# Patient Record
Sex: MALE | Race: WHITE | Employment: FULL TIME | URBAN - METROPOLITAN AREA
[De-identification: names, ages, dates, MRNs, and addresses within clinical notes are randomized per-mention and may not be internally consistent; named-entity substitution may affect disease eponyms.]

---

## 2017-01-04 ENCOUNTER — TELEPHONE (OUTPATIENT)
Dept: FAMILY MEDICINE CLINIC | Age: 37
End: 2017-01-04

## 2017-01-04 RX ORDER — AZITHROMYCIN 250 MG/1
TABLET, FILM COATED ORAL
Qty: 6 TABLET | Refills: 0 | Status: SHIPPED | OUTPATIENT
Start: 2017-01-04 | End: 2017-01-09

## 2017-01-09 ENCOUNTER — TELEPHONE (OUTPATIENT)
Dept: FAMILY MEDICINE CLINIC | Age: 37
End: 2017-01-09

## 2017-01-09 RX ORDER — DOXYCYCLINE HYCLATE 100 MG
100 TABLET ORAL 2 TIMES DAILY
Qty: 20 TABLET | Refills: 0 | Status: SHIPPED | OUTPATIENT
Start: 2017-01-09 | End: 2017-01-19

## 2017-01-30 ENCOUNTER — OFFICE VISIT (OUTPATIENT)
Dept: FAMILY MEDICINE CLINIC | Age: 37
End: 2017-01-30

## 2017-01-30 VITALS
HEART RATE: 76 BPM | HEIGHT: 72 IN | OXYGEN SATURATION: 99 % | DIASTOLIC BLOOD PRESSURE: 84 MMHG | BODY MASS INDEX: 28.85 KG/M2 | WEIGHT: 213 LBS | SYSTOLIC BLOOD PRESSURE: 130 MMHG

## 2017-01-30 DIAGNOSIS — J01.41 ACUTE RECURRENT PANSINUSITIS: Primary | ICD-10-CM

## 2017-01-30 PROCEDURE — 99213 OFFICE O/P EST LOW 20 MIN: CPT | Performed by: FAMILY MEDICINE

## 2017-01-30 RX ORDER — AMOXICILLIN 875 MG/1
875 TABLET, COATED ORAL 2 TIMES DAILY
Qty: 28 TABLET | Refills: 0 | Status: SHIPPED | OUTPATIENT
Start: 2017-01-30 | End: 2017-08-30 | Stop reason: SDUPTHER

## 2017-01-30 ASSESSMENT — PATIENT HEALTH QUESTIONNAIRE - PHQ9
2. FEELING DOWN, DEPRESSED OR HOPELESS: 0
SUM OF ALL RESPONSES TO PHQ9 QUESTIONS 1 & 2: 0
SUM OF ALL RESPONSES TO PHQ QUESTIONS 1-9: 0
1. LITTLE INTEREST OR PLEASURE IN DOING THINGS: 0

## 2017-01-30 ASSESSMENT — ENCOUNTER SYMPTOMS
GASTROINTESTINAL NEGATIVE: 1
COUGH: 1
SINUS PRESSURE: 1

## 2017-02-09 ENCOUNTER — TELEPHONE (OUTPATIENT)
Dept: FAMILY MEDICINE CLINIC | Age: 37
End: 2017-02-09

## 2017-02-09 DIAGNOSIS — M51.37 DEGENERATION OF LUMBAR OR LUMBOSACRAL INTERVERTEBRAL DISC: Primary | ICD-10-CM

## 2017-02-09 DIAGNOSIS — M51.26 DISPLACEMENT OF LUMBAR INTERVERTEBRAL DISC WITHOUT MYELOPATHY: ICD-10-CM

## 2017-03-23 ENCOUNTER — HOSPITAL ENCOUNTER (OUTPATIENT)
Dept: MRI IMAGING | Age: 37
Discharge: HOME | End: 2017-03-23

## 2017-03-23 DIAGNOSIS — M51.26 DISPLACEMENT OF LUMBAR INTERVERTEBRAL DISC WITHOUT MYELOPATHY: ICD-10-CM

## 2017-06-27 ENCOUNTER — TELEPHONE (OUTPATIENT)
Dept: FAMILY MEDICINE CLINIC | Age: 37
End: 2017-06-27

## 2017-06-28 RX ORDER — AZITHROMYCIN 250 MG/1
TABLET, FILM COATED ORAL
Qty: 6 TABLET | Refills: 0 | Status: SHIPPED | OUTPATIENT
Start: 2017-06-28 | End: 2017-07-08

## 2017-08-30 ENCOUNTER — TELEPHONE (OUTPATIENT)
Dept: FAMILY MEDICINE CLINIC | Age: 37
End: 2017-08-30

## 2017-08-30 RX ORDER — AMOXICILLIN 875 MG/1
875 TABLET, COATED ORAL 2 TIMES DAILY
Qty: 28 TABLET | Refills: 0 | Status: SHIPPED | OUTPATIENT
Start: 2017-08-30 | End: 2017-11-17 | Stop reason: ALTCHOICE

## 2017-11-17 ENCOUNTER — OFFICE VISIT (OUTPATIENT)
Dept: FAMILY MEDICINE CLINIC | Age: 37
End: 2017-11-17

## 2017-11-17 VITALS
OXYGEN SATURATION: 99 % | DIASTOLIC BLOOD PRESSURE: 76 MMHG | WEIGHT: 208 LBS | BODY MASS INDEX: 28.21 KG/M2 | HEART RATE: 71 BPM | SYSTOLIC BLOOD PRESSURE: 102 MMHG

## 2017-11-17 DIAGNOSIS — G89.29 CHRONIC BILATERAL LOW BACK PAIN WITH LEFT-SIDED SCIATICA: Primary | ICD-10-CM

## 2017-11-17 DIAGNOSIS — M54.42 CHRONIC BILATERAL LOW BACK PAIN WITH LEFT-SIDED SCIATICA: Primary | ICD-10-CM

## 2017-11-17 PROCEDURE — 99213 OFFICE O/P EST LOW 20 MIN: CPT | Performed by: FAMILY MEDICINE

## 2017-11-17 RX ORDER — MELOXICAM 15 MG/1
15 TABLET ORAL DAILY
Qty: 30 TABLET | Refills: 3 | Status: SHIPPED | OUTPATIENT
Start: 2017-11-17 | End: 2022-07-22 | Stop reason: ALTCHOICE

## 2017-11-17 RX ORDER — CYCLOBENZAPRINE HCL 10 MG
10 TABLET ORAL 3 TIMES DAILY PRN
Qty: 30 TABLET | Refills: 0 | Status: SHIPPED | OUTPATIENT
Start: 2017-11-17 | End: 2017-11-27

## 2017-11-17 RX ORDER — METHYLPREDNISOLONE 4 MG/1
TABLET ORAL
Qty: 21 TABLET | Refills: 0 | Status: SHIPPED | OUTPATIENT
Start: 2017-11-17 | End: 2018-07-02

## 2017-11-17 ASSESSMENT — ENCOUNTER SYMPTOMS: BACK PAIN: 1

## 2017-11-17 NOTE — PROGRESS NOTES
tablet; Take 1 tablet by mouth 3 times daily as needed for Muscle spasms  Dispense: 30 tablet; Refill: 0  - meloxicam (MOBIC) 15 MG tablet; Take 1 tablet by mouth daily  Dispense: 30 tablet; Refill: 3  - methylPREDNISolone (MEDROL DOSEPACK) 4 MG tablet; Take by mouth. Dispense: 21 tablet; Refill: 0      Patient to return to clinic if symptoms worsen or fail to improve.

## 2018-04-04 ENCOUNTER — TELEPHONE (OUTPATIENT)
Dept: FAMILY MEDICINE CLINIC | Age: 38
End: 2018-04-04

## 2018-07-02 ENCOUNTER — OFFICE VISIT (OUTPATIENT)
Dept: FAMILY MEDICINE CLINIC | Age: 38
End: 2018-07-02

## 2018-07-02 VITALS
DIASTOLIC BLOOD PRESSURE: 82 MMHG | WEIGHT: 214 LBS | BODY MASS INDEX: 28.99 KG/M2 | HEIGHT: 72 IN | OXYGEN SATURATION: 98 % | RESPIRATION RATE: 16 BRPM | SYSTOLIC BLOOD PRESSURE: 130 MMHG | HEART RATE: 74 BPM | TEMPERATURE: 98.1 F

## 2018-07-02 DIAGNOSIS — L72.0 EPIDERMOID CYST OF SKIN: ICD-10-CM

## 2018-07-02 DIAGNOSIS — K42.9 UMBILICAL HERNIA WITHOUT OBSTRUCTION AND WITHOUT GANGRENE: ICD-10-CM

## 2018-07-02 PROCEDURE — 99213 OFFICE O/P EST LOW 20 MIN: CPT | Performed by: NURSE PRACTITIONER

## 2018-07-02 RX ORDER — HYDROCODONE BITARTRATE AND ACETAMINOPHEN 5; 325 MG/1; MG/1
TABLET ORAL
COMMUNITY
Start: 2018-06-08 | End: 2022-06-29 | Stop reason: SDUPTHER

## 2018-07-02 RX ORDER — METHOCARBAMOL 750 MG/1
TABLET, FILM COATED ORAL
COMMUNITY
Start: 2018-05-21

## 2018-07-02 ASSESSMENT — PATIENT HEALTH QUESTIONNAIRE - PHQ9
2. FEELING DOWN, DEPRESSED OR HOPELESS: 0
SUM OF ALL RESPONSES TO PHQ9 QUESTIONS 1 & 2: 0
1. LITTLE INTEREST OR PLEASURE IN DOING THINGS: 0
SUM OF ALL RESPONSES TO PHQ QUESTIONS 1-9: 0

## 2018-07-02 NOTE — PROGRESS NOTES
Mateusz Lam 44 y.o. male    Chief Complaint   Patient presents with    Mass     small bump near umbilical- no pain       HPI     Lump around umbilicus- popped out after stretching, noted a week ago. No pain. No redness. Nodule under skin, left forearm, has had it for many years, unchanged, no pain. Past medical, surgical, family and social history were reviewed and updated with the patient. Current Outpatient Prescriptions:     methocarbamol (ROBAXIN) 750 MG tablet, , Disp: , Rfl:     HYDROcodone-acetaminophen (NORCO) 5-325 MG per tablet, , Disp: , Rfl:     meloxicam (MOBIC) 15 MG tablet, Take 1 tablet by mouth daily, Disp: 30 tablet, Rfl: 3    esomeprazole Magnesium (NEXIUM) 40 MG PACK, Take 40 mg by mouth daily. , Disp: , Rfl:     methylPREDNISolone (MEDROL DOSEPACK) 4 MG tablet, Take by mouth., Disp: 21 tablet, Rfl: 0    ROS  Pertinent items noted in HPI    Physical Exam   Constitutional: He is oriented to person, place, and time. He appears well-developed and well-nourished. No distress. Abdominal: A hernia (umbilical, small, reducible, non tender) is present. Neurological: He is alert and oriented to person, place, and time. Skin:   Right forearm with about dime size, hard nodule, movable, no erythema, no tenderness   Nursing note and vitals reviewed. Vitals:    07/02/18 0909   BP: 130/82   Pulse: 74   Resp: 16   Temp: 98.1 °F (36.7 °C)   SpO2: 98%     Assessment    1. Umbilical hernia without obstruction and without gangrene    2. Epidermoid cyst of skin        Plan    Zuleika Meek was seen today for mass and mass.     Diagnoses and all orders for this visit:    Umbilical hernia without obstruction and without gangrene  - asymptomatic, observe for now, follow-up if worsen  Epidermoid cyst of skin  - reassurance provided, follow-up if gets bigger, red, painful

## 2019-04-05 ENCOUNTER — OFFICE VISIT (OUTPATIENT)
Dept: FAMILY MEDICINE CLINIC | Age: 39
End: 2019-04-05

## 2019-04-05 VITALS
DIASTOLIC BLOOD PRESSURE: 90 MMHG | BODY MASS INDEX: 32.05 KG/M2 | HEIGHT: 72 IN | SYSTOLIC BLOOD PRESSURE: 145 MMHG | RESPIRATION RATE: 20 BRPM | TEMPERATURE: 97.6 F | OXYGEN SATURATION: 99 % | WEIGHT: 236.6 LBS | HEART RATE: 76 BPM

## 2019-04-05 DIAGNOSIS — I10 ESSENTIAL HYPERTENSION: ICD-10-CM

## 2019-04-05 DIAGNOSIS — L50.9 URTICARIA: Primary | ICD-10-CM

## 2019-04-05 DIAGNOSIS — F41.0 PANIC ATTACKS: ICD-10-CM

## 2019-04-05 PROCEDURE — 99212 OFFICE O/P EST SF 10 MIN: CPT | Performed by: FAMILY MEDICINE

## 2019-04-05 RX ORDER — PAROXETINE 10 MG/1
10 TABLET, FILM COATED ORAL DAILY
Qty: 30 TABLET | Refills: 0 | Status: SHIPPED | OUTPATIENT
Start: 2019-04-05 | End: 2019-04-27 | Stop reason: SDUPTHER

## 2019-04-05 ASSESSMENT — PATIENT HEALTH QUESTIONNAIRE - PHQ9
2. FEELING DOWN, DEPRESSED OR HOPELESS: 0
SUM OF ALL RESPONSES TO PHQ QUESTIONS 1-9: 0
SUM OF ALL RESPONSES TO PHQ9 QUESTIONS 1 & 2: 0
1. LITTLE INTEREST OR PLEASURE IN DOING THINGS: 0
SUM OF ALL RESPONSES TO PHQ QUESTIONS 1-9: 0

## 2019-04-05 ASSESSMENT — ENCOUNTER SYMPTOMS
ABDOMINAL PAIN: 0
SHORTNESS OF BREATH: 0
COUGH: 0
BLOOD IN STOOL: 0

## 2019-04-05 NOTE — PROGRESS NOTES
Subjective:      Patient ID: Juarez Durant is a 43 y.o. male. HPI  In for check on poss HT(has been up at orthop office-onset 3 mos)--Hives(onset 6-8 weeks-2-3 x per week-did stop some vitamins he started-hives gone for now)--upper CP-onset 2 weeks-2 x per week-lasts 2 hrs-liquids help for few min--GERD-on PPI x several yrs. Anxiety-near panic-onset 1 mo-daily-usually HS. Prior to Visit Medications :  Medication methocarbamol (ROBAXIN) 750 MG tablet, Sig , Taking? Yes, Authorizing Provider Historical Provider, MD    Medication HYDROcodone-acetaminophen ([address]) 5-325 MG per tablet, Sig , Taking? Yes, Authorizing Provider Historical Provider, MD    Medication meloxicam (MOBIC) 15 MG tablet, Sig Take 1 tablet by mouth daily, Taking? Yes, Authorizing Provider Óscar Gramajo, DO    Medication esomeprazole Magnesium (NEXIUM) 40 MG PACK, Sig Take 40 mg by mouth daily. , Taking? Yes, Authorizing Provider Historical Provider, MD      Past Medical History:  No date: Allergic rhinitis  12/17/2014: Degeneration of lumbar or lumbosacral intervertebral disc  2007: GERD (gastroesophageal reflux disease)      Comment:  reflux  No date: Headache(784.0)  No date: Renal calculus  No date: Tobacco abuse  No date: Tobacco abuse  3/2/12: Vitamin D deficiency      Comment:  11.6 ng/mL        Review of Systems    Review of Systems   Constitutional: Negative for unexpected weight change. HENT: Negative for congestion and postnasal drip. Eyes: Negative for visual disturbance. Respiratory: Negative for cough and shortness of breath. Cardiovascular: Negative for palpitations. See hpi--interm palpitations-2 x per week-lasts few secs   Gastrointestinal: Negative for abdominal pain and blood in stool. See hpi     Neurological: Negative for tremors and headaches. Psychiatric/Behavioral: Negative for sleep disturbance. The patient is nervous/anxious.         Objective:   Physical Exam      Physical Exam

## 2019-12-06 ENCOUNTER — TELEPHONE (OUTPATIENT)
Dept: FAMILY MEDICINE CLINIC | Age: 39
End: 2019-12-06

## 2019-12-06 ENCOUNTER — OFFICE VISIT (OUTPATIENT)
Dept: FAMILY MEDICINE CLINIC | Age: 39
End: 2019-12-06

## 2019-12-06 VITALS
RESPIRATION RATE: 16 BRPM | HEIGHT: 72 IN | DIASTOLIC BLOOD PRESSURE: 75 MMHG | SYSTOLIC BLOOD PRESSURE: 140 MMHG | HEART RATE: 90 BPM | OXYGEN SATURATION: 97 % | WEIGHT: 232 LBS | TEMPERATURE: 97.6 F | BODY MASS INDEX: 31.42 KG/M2

## 2019-12-06 DIAGNOSIS — J01.90 ACUTE NON-RECURRENT SINUSITIS, UNSPECIFIED LOCATION: Primary | ICD-10-CM

## 2019-12-06 PROCEDURE — 99213 OFFICE O/P EST LOW 20 MIN: CPT | Performed by: FAMILY MEDICINE

## 2019-12-06 RX ORDER — AMOXICILLIN 875 MG/1
875 TABLET, COATED ORAL 2 TIMES DAILY
Qty: 20 TABLET | Refills: 0 | Status: SHIPPED | OUTPATIENT
Start: 2019-12-06 | End: 2019-12-16

## 2019-12-06 ASSESSMENT — ENCOUNTER SYMPTOMS
VOMITING: 0
SHORTNESS OF BREATH: 0
SORE THROAT: 1
NAUSEA: 0
SINUS PRESSURE: 1
DIARRHEA: 0
COUGH: 1

## 2020-02-20 ENCOUNTER — TELEPHONE (OUTPATIENT)
Dept: FAMILY MEDICINE CLINIC | Age: 40
End: 2020-02-20

## 2020-02-20 NOTE — TELEPHONE ENCOUNTER
LMA on pt's vm for him to r/s with his PCP. Advised that if he wants to switch PCP's the change must be approved by both providers.

## 2020-02-24 ENCOUNTER — OFFICE VISIT (OUTPATIENT)
Dept: FAMILY MEDICINE CLINIC | Age: 40
End: 2020-02-24

## 2020-02-24 VITALS
HEART RATE: 85 BPM | DIASTOLIC BLOOD PRESSURE: 90 MMHG | HEIGHT: 72 IN | WEIGHT: 236 LBS | OXYGEN SATURATION: 98 % | SYSTOLIC BLOOD PRESSURE: 135 MMHG | BODY MASS INDEX: 31.97 KG/M2

## 2020-02-24 PROBLEM — R07.89 OTHER CHEST PAIN: Status: ACTIVE | Noted: 2020-02-24

## 2020-02-24 PROBLEM — S16.1XXA CERVICAL STRAIN: Status: ACTIVE | Noted: 2020-02-24

## 2020-02-24 PROBLEM — I10 DIASTOLIC HYPERTENSION: Status: ACTIVE | Noted: 2020-02-24

## 2020-02-24 PROCEDURE — 99212 OFFICE O/P EST SF 10 MIN: CPT | Performed by: FAMILY MEDICINE

## 2020-02-24 RX ORDER — METOPROLOL SUCCINATE 25 MG/1
25 TABLET, EXTENDED RELEASE ORAL DAILY
Qty: 30 TABLET | Refills: 2 | Status: SHIPPED | OUTPATIENT
Start: 2020-02-24 | End: 2020-04-20 | Stop reason: SDUPTHER

## 2020-02-24 ASSESSMENT — PATIENT HEALTH QUESTIONNAIRE - PHQ9
SUM OF ALL RESPONSES TO PHQ QUESTIONS 1-9: 0
SUM OF ALL RESPONSES TO PHQ9 QUESTIONS 1 & 2: 0
SUM OF ALL RESPONSES TO PHQ QUESTIONS 1-9: 0
2. FEELING DOWN, DEPRESSED OR HOPELESS: 0
1. LITTLE INTEREST OR PLEASURE IN DOING THINGS: 0

## 2020-02-24 ASSESSMENT — ENCOUNTER SYMPTOMS: SHORTNESS OF BREATH: 0

## 2020-02-24 NOTE — PROGRESS NOTES
Subjective:      Patient ID: Suellen Espinosa is a 35 y.o. male. HPI  Patient in for check on blood pressure- he goes to pain management and they have told him his pressures been up the last 2 or 3 times. He states he also needs to lose some weight which he knows will help his blood pressure. He states he has had some vague chest pains usually at night but can be during the day which are very sharp almost electric-like and usually last a matter of seconds-occasionally last up to 1 minute. Also intermittent neck pain usually on the left side of his neck which is fairly constant- pain is worse when he rotates his head to the left. Prior to Visit Medications :  Medication metoprolol succinate (TOPROL XL) 25 MG extended release tablet, Sig Take 1 tablet by mouth daily, Taking? Yes, Authorizing Provider Sarbjit Blanton, DO    Medication PARoxetine (PAXIL) 10 MG tablet, Sig TAKE 1 TABLET BY MOUTH EVERY DAY, Taking? Yes, Authorizing Provider Sarbjit Blanton, DO    Medication methocarbamol (ROBAXIN) 750 MG tablet, Sig , Taking? Yes, Authorizing Provider Historical Provider, MD    Medication HYDROcodone-acetaminophen ([address]) 5-325 MG per tablet, Sig , Taking? Yes, Authorizing Provider Historical Provider, MD    Medication meloxicam (MOBIC) 15 MG tablet, Sig Take 1 tablet by mouth daily, Taking? Yes, Authorizing Provider Sonya El, DO    Medication esomeprazole Magnesium (NEXIUM) 40 MG PACK, Sig Take 40 mg by mouth daily. , Taking? Yes, Authorizing Provider Historical Provider, MD      Past Medical History:  No date:  Allergic rhinitis  12/17/2014: Degeneration of lumbar or lumbosacral intervertebral disc  2007: GERD (gastroesophageal reflux disease)      Comment:  reflux  No date: Headache(784.0)  No date: Renal calculus  No date: Tobacco abuse  No date: Tobacco abuse  3/2/12: Vitamin D deficiency      Comment:  11.6 ng/mL            Review of Systems    Review of Systems   Respiratory: Negative for shortness of breath. Cardiovascular: Positive for chest pain. Negative for palpitations and leg swelling. See HPI   Musculoskeletal: Positive for neck pain. Objective:   Physical Exam      Physical Exam  Cardiovascular:      Rate and Rhythm: Normal rate. Heart sounds: Normal heart sounds. Pulmonary:      Effort: Pulmonary effort is normal.      Breath sounds: Normal breath sounds. Musculoskeletal:      Comments: Mild tenderness left lateral cervical muscles- pain increased with rotation to the left. Neurological:      Mental Status: He is oriented to person, place, and time. Assessment:      1. Diastolic hypertension    2. Strain of neck muscle, initial encounter    3. Other chest pain              Plan:      Jeny Reddy was seen today for blood pressure check. Diagnoses and all orders for this visit:    Diastolic hypertension  Prescription sent for metoprolol 25 mg-no added salt diet and start working on trying to lose 2 or 3 pounds a month which will help bring the blood pressure down. Strain of neck muscle, initial encounter  Instructed on neck exercises to be done twice a day  Other chest pain  Will review family history and consider EKG or possible stress test  Other orders  -     metoprolol succinate (TOPROL XL) 25 MG extended release tablet;  Take 1 tablet by mouth daily      See me 3 months        Sarbjit Blanton, DO

## 2020-04-20 RX ORDER — METOPROLOL SUCCINATE 25 MG/1
25 TABLET, EXTENDED RELEASE ORAL DAILY
Qty: 90 TABLET | Refills: 0 | Status: SHIPPED | OUTPATIENT
Start: 2020-04-20 | End: 2020-08-06

## 2020-08-06 RX ORDER — METOPROLOL SUCCINATE 25 MG/1
TABLET, EXTENDED RELEASE ORAL
Qty: 90 TABLET | Refills: 1 | Status: SHIPPED | OUTPATIENT
Start: 2020-08-06 | End: 2022-07-22 | Stop reason: ALTCHOICE

## 2020-08-06 NOTE — TELEPHONE ENCOUNTER
Refill Request     Last Seen: 2/24/2020    Last Written: 4/20/20     Next Appointment:   No future appointments.           Requested Prescriptions     Pending Prescriptions Disp Refills    metoprolol succinate (TOPROL XL) 25 MG extended release tablet [Pharmacy Med Name: METOPROLOL SUCC ER 25 MG TAB] 90 tablet 0     Sig: TAKE 1 TABLET BY MOUTH EVERY DAY

## 2022-05-01 ENCOUNTER — HOSPITAL ENCOUNTER (OUTPATIENT)
Dept: GENERAL RADIOLOGY | Age: 42
Discharge: HOME | End: 2022-05-01
Payer: COMMERCIAL

## 2022-05-01 ENCOUNTER — HOSPITAL ENCOUNTER (OUTPATIENT)
Age: 42
Discharge: HOME | End: 2022-05-01
Payer: COMMERCIAL

## 2022-05-01 DIAGNOSIS — M51.26 DISPLACEMENT OF LUMBAR INTERVERTEBRAL DISC WITHOUT MYELOPATHY: ICD-10-CM

## 2022-05-01 PROCEDURE — 72100 X-RAY EXAM L-S SPINE 2/3 VWS: CPT

## 2022-06-27 RX ORDER — HYDROCODONE BITARTRATE AND ACETAMINOPHEN 5; 325 MG/1; MG/1
1 TABLET ORAL EVERY 12 HOURS PRN
Qty: 60 TABLET | Refills: 0 | OUTPATIENT
Start: 2022-06-27 | End: 2022-07-27

## 2022-06-27 NOTE — TELEPHONE ENCOUNTER
Patients Wife called stating that 's Pain management clinic (Dr. Kahlil Durham) office Closed. Patient Has a New Patient pain Management appointment scheduled with a New Provider (Dr. Steven Sanz) but is unable to get into their office until Aug. 18th. Pain Management is asking if Patients PCP will continue to write script for [place] until Patient is seen on Aug. 18th. Patient will be out of [place] on July 1st. Writer Pend Medication. Please Advised       Refill Request - Controlled Substance    CONFIRM preferrred pharmacy with the patient. Last Seen Department: 2/24/2020  Last Seen by PCP: 2/24/2020    Last Written: 06/08/2018     Last UDS: none    Med Agreement Signed On: none     Next Appointment: 11/9/2022    Future appointment scheduled    Requested Prescriptions     Pending Prescriptions Disp Refills    HYDROcodone-acetaminophen (NORCO) 5-325 MG per tablet 60 tablet 0     Sig: Take 1 tablet by mouth every 12 hours as needed for Pain for up to 30 days.

## 2022-06-28 NOTE — TELEPHONE ENCOUNTER
FD, please help to schedule pt with someone in the next 3-4 weeks for a medication follow up, thank you

## 2022-06-29 DIAGNOSIS — M51.37 DEGENERATION OF LUMBAR OR LUMBOSACRAL INTERVERTEBRAL DISC: Primary | ICD-10-CM

## 2022-06-29 RX ORDER — HYDROCODONE BITARTRATE AND ACETAMINOPHEN 5; 325 MG/1; MG/1
1 TABLET ORAL EVERY 8 HOURS PRN
Qty: 30 TABLET | Refills: 0 | Status: SHIPPED | OUTPATIENT
Start: 2022-06-29 | End: 2022-07-22 | Stop reason: SDUPTHER

## 2022-06-29 NOTE — TELEPHONE ENCOUNTER
I sent and then canceled his prescription because he had been seeing Dr. Janice Ortiz who I think has stopped practicing but he is going to need to come in or call us concerning getting a new pain doctor.

## 2022-06-29 NOTE — TELEPHONE ENCOUNTER
----- Message from Saravanan Ledbetter sent at 6/29/2022 12:35 PM EDT -----  Subject: Refill Request    QUESTIONS  Name of Medication? Other - hydrocodone- acetaminophen 5-325mg tablet  Patient-reported dosage and instructions? as needed  How many days do you have left? 0  Preferred Pharmacy? [id number]  Pharmacy phone number (if available)? [phone]  ---------------------------------------------------------------------------  --------------  Bety Slot INFO  What is the best way for the office to contact you? OK to leave message on   voicemail  Preferred Call Back Phone Number? [phone]  ---------------------------------------------------------------------------  --------------  SCRIPT ANSWERS  Relationship to Patient? Other  Representative Name? Crystal  Is the Representative on the appropriate HIPAA document in Epic?  Yes

## 2022-06-29 NOTE — TELEPHONE ENCOUNTER
Refill Request - Controlled Substance    CONFIRM preferrred pharmacy with the patient. Last Seen Department: 2/24/2020  Last Seen by PCP: 2/24/2020    Last Written: N/A- historical provider. Not sure dosing is correct    Last UDS: N/A    Med Agreement Signed On: N/A    Next Appointment: 7/22/2022        Requested Prescriptions     Pending Prescriptions Disp Refills    HYDROcodone-acetaminophen (NORCO) 5-325 MG per tablet 30 tablet 0     Sig: Take 1 tablet by mouth every 8 hours as needed for Pain for up to 30 days.

## 2022-06-30 NOTE — TELEPHONE ENCOUNTER
Spoke with patient and relayed message. He stated he does have a new pain Doctor (Dr. Ezequiel Velasquez) but can't get in until August. They told him to contact his PCP to get the med refill.

## 2022-06-30 NOTE — TELEPHONE ENCOUNTER
He has an appointment scheduled with Dr. Nic Gamez for 7/22, it that OK? Or should he be seen earlier?

## 2022-07-22 ENCOUNTER — OFFICE VISIT (OUTPATIENT)
Dept: FAMILY MEDICINE CLINIC | Age: 42
End: 2022-07-22
Payer: COMMERCIAL

## 2022-07-22 VITALS
DIASTOLIC BLOOD PRESSURE: 62 MMHG | OXYGEN SATURATION: 98 % | SYSTOLIC BLOOD PRESSURE: 112 MMHG | BODY MASS INDEX: 32.01 KG/M2 | HEART RATE: 81 BPM | WEIGHT: 236 LBS

## 2022-07-22 DIAGNOSIS — M54.42 CHRONIC BILATERAL LOW BACK PAIN WITH LEFT-SIDED SCIATICA: Primary | ICD-10-CM

## 2022-07-22 DIAGNOSIS — G89.29 CHRONIC BILATERAL LOW BACK PAIN WITH LEFT-SIDED SCIATICA: Primary | ICD-10-CM

## 2022-07-22 DIAGNOSIS — M51.37 DEGENERATION OF LUMBAR OR LUMBOSACRAL INTERVERTEBRAL DISC: ICD-10-CM

## 2022-07-22 PROCEDURE — 1036F TOBACCO NON-USER: CPT | Performed by: FAMILY MEDICINE

## 2022-07-22 PROCEDURE — G8428 CUR MEDS NOT DOCUMENT: HCPCS | Performed by: FAMILY MEDICINE

## 2022-07-22 PROCEDURE — G8419 CALC BMI OUT NRM PARAM NOF/U: HCPCS | Performed by: FAMILY MEDICINE

## 2022-07-22 PROCEDURE — 99213 OFFICE O/P EST LOW 20 MIN: CPT | Performed by: FAMILY MEDICINE

## 2022-07-22 RX ORDER — HYDROCODONE BITARTRATE AND ACETAMINOPHEN 5; 325 MG/1; MG/1
1 TABLET ORAL 2 TIMES DAILY PRN
Qty: 36 TABLET | Refills: 0 | Status: SHIPPED | OUTPATIENT
Start: 2022-07-30 | End: 2022-08-17

## 2022-07-22 SDOH — ECONOMIC STABILITY: FOOD INSECURITY: WITHIN THE PAST 12 MONTHS, YOU WORRIED THAT YOUR FOOD WOULD RUN OUT BEFORE YOU GOT MONEY TO BUY MORE.: NEVER TRUE

## 2022-07-22 SDOH — ECONOMIC STABILITY: HOUSING INSECURITY
IN THE LAST 12 MONTHS, WAS THERE A TIME WHEN YOU DID NOT HAVE A STEADY PLACE TO SLEEP OR SLEPT IN A SHELTER (INCLUDING NOW)?: NO

## 2022-07-22 SDOH — ECONOMIC STABILITY: FOOD INSECURITY: WITHIN THE PAST 12 MONTHS, THE FOOD YOU BOUGHT JUST DIDN'T LAST AND YOU DIDN'T HAVE MONEY TO GET MORE.: NEVER TRUE

## 2022-07-22 SDOH — ECONOMIC STABILITY: INCOME INSECURITY: IN THE LAST 12 MONTHS, WAS THERE A TIME WHEN YOU WERE NOT ABLE TO PAY THE MORTGAGE OR RENT ON TIME?: NO

## 2022-07-22 SDOH — ECONOMIC STABILITY: TRANSPORTATION INSECURITY
IN THE PAST 12 MONTHS, HAS LACK OF TRANSPORTATION KEPT YOU FROM MEETINGS, WORK, OR FROM GETTING THINGS NEEDED FOR DAILY LIVING?: NO

## 2022-07-22 SDOH — ECONOMIC STABILITY: HOUSING INSECURITY: IN THE LAST 12 MONTHS, HOW MANY PLACES HAVE YOU LIVED?: 1

## 2022-07-22 SDOH — ECONOMIC STABILITY: TRANSPORTATION INSECURITY
IN THE PAST 12 MONTHS, HAS THE LACK OF TRANSPORTATION KEPT YOU FROM MEDICAL APPOINTMENTS OR FROM GETTING MEDICATIONS?: NO

## 2022-07-22 ASSESSMENT — ANXIETY QUESTIONNAIRES
5. BEING SO RESTLESS THAT IT IS HARD TO SIT STILL: 0
GAD7 TOTAL SCORE: 4
4. TROUBLE RELAXING: 1
IF YOU CHECKED OFF ANY PROBLEMS ON THIS QUESTIONNAIRE, HOW DIFFICULT HAVE THESE PROBLEMS MADE IT FOR YOU TO DO YOUR WORK, TAKE CARE OF THINGS AT HOME, OR GET ALONG WITH OTHER PEOPLE: NOT DIFFICULT AT ALL
3. WORRYING TOO MUCH ABOUT DIFFERENT THINGS: 0
6. BECOMING EASILY ANNOYED OR IRRITABLE: 0
7. FEELING AFRAID AS IF SOMETHING AWFUL MIGHT HAPPEN: 1
2. NOT BEING ABLE TO STOP OR CONTROL WORRYING: 1
1. FEELING NERVOUS, ANXIOUS, OR ON EDGE: 1

## 2022-07-22 ASSESSMENT — PATIENT HEALTH QUESTIONNAIRE - PHQ9
SUM OF ALL RESPONSES TO PHQ QUESTIONS 1-9: 0
SUM OF ALL RESPONSES TO PHQ QUESTIONS 1-9: 0
SUM OF ALL RESPONSES TO PHQ9 QUESTIONS 1 & 2: 0
2. FEELING DOWN, DEPRESSED OR HOPELESS: 0
SUM OF ALL RESPONSES TO PHQ QUESTIONS 1-9: 0
SUM OF ALL RESPONSES TO PHQ QUESTIONS 1-9: 0
1. LITTLE INTEREST OR PLEASURE IN DOING THINGS: 0

## 2022-07-22 ASSESSMENT — LIFESTYLE VARIABLES
HOW MANY STANDARD DRINKS CONTAINING ALCOHOL DO YOU HAVE ON A TYPICAL DAY: PATIENT DOES NOT DRINK
HOW OFTEN DO YOU HAVE A DRINK CONTAINING ALCOHOL: NEVER

## 2022-07-22 ASSESSMENT — ENCOUNTER SYMPTOMS: BACK PAIN: 1

## 2022-07-22 ASSESSMENT — SOCIAL DETERMINANTS OF HEALTH (SDOH): HOW HARD IS IT FOR YOU TO PAY FOR THE VERY BASICS LIKE FOOD, HOUSING, MEDICAL CARE, AND HEATING?: NOT HARD AT ALL

## 2022-07-22 NOTE — PROGRESS NOTES
at this pharmacy but I would give him a short supply to last.  - HYDROcodone-acetaminophen (NORCO) 5-325 MG per tablet; Take 1 tablet by mouth 2 times daily as needed for Pain for up to 18 days. Dispense: 36 tablet; Refill: 0    2. Degeneration of lumbar or lumbosacral intervertebral disc  - HYDROcodone-acetaminophen (NORCO) 5-325 MG per tablet; Take 1 tablet by mouth 2 times daily as needed for Pain for up to 18 days. Dispense: 36 tablet; Refill: 0      Patient to return to clinic if symptoms worsen or fail to improve.

## 2023-01-18 ENCOUNTER — OFFICE VISIT (OUTPATIENT)
Dept: FAMILY MEDICINE CLINIC | Age: 43
End: 2023-01-18
Payer: COMMERCIAL

## 2023-01-18 VITALS
HEIGHT: 72 IN | SYSTOLIC BLOOD PRESSURE: 136 MMHG | OXYGEN SATURATION: 97 % | DIASTOLIC BLOOD PRESSURE: 88 MMHG | WEIGHT: 229 LBS | BODY MASS INDEX: 31.02 KG/M2 | HEART RATE: 81 BPM

## 2023-01-18 DIAGNOSIS — R00.0 RAPID RESTING HEART RATE: ICD-10-CM

## 2023-01-18 DIAGNOSIS — J01.10 ACUTE FRONTAL SINUSITIS, RECURRENCE NOT SPECIFIED: Primary | ICD-10-CM

## 2023-01-18 DIAGNOSIS — R79.89 ELEVATED TSH: Primary | ICD-10-CM

## 2023-01-18 LAB
A/G RATIO: 1.5 (ref 1.1–2.2)
ALBUMIN SERPL-MCNC: 4.6 G/DL (ref 3.4–5)
ALP BLD-CCNC: 79 U/L (ref 40–129)
ALT SERPL-CCNC: 20 U/L (ref 10–40)
ANION GAP SERPL CALCULATED.3IONS-SCNC: 12 MMOL/L (ref 3–16)
AST SERPL-CCNC: 21 U/L (ref 15–37)
BILIRUB SERPL-MCNC: 0.3 MG/DL (ref 0–1)
BUN BLDV-MCNC: 15 MG/DL (ref 7–20)
CALCIUM SERPL-MCNC: 10.1 MG/DL (ref 8.3–10.6)
CHLORIDE BLD-SCNC: 101 MMOL/L (ref 99–110)
CO2: 27 MMOL/L (ref 21–32)
CREAT SERPL-MCNC: 1.2 MG/DL (ref 0.9–1.3)
GFR SERPL CREATININE-BSD FRML MDRD: >60 ML/MIN/{1.73_M2}
GLUCOSE BLD-MCNC: 102 MG/DL (ref 70–99)
MAGNESIUM: 1.9 MG/DL (ref 1.8–2.4)
POTASSIUM SERPL-SCNC: 4.6 MMOL/L (ref 3.5–5.1)
SODIUM BLD-SCNC: 140 MMOL/L (ref 136–145)
T4 FREE: 1.1 NG/DL (ref 0.9–1.8)
TOTAL PROTEIN: 7.6 G/DL (ref 6.4–8.2)
TSH SERPL DL<=0.05 MIU/L-ACNC: 10.51 UIU/ML (ref 0.27–4.2)

## 2023-01-18 PROCEDURE — G8484 FLU IMMUNIZE NO ADMIN: HCPCS | Performed by: NURSE PRACTITIONER

## 2023-01-18 PROCEDURE — G8427 DOCREV CUR MEDS BY ELIG CLIN: HCPCS | Performed by: NURSE PRACTITIONER

## 2023-01-18 PROCEDURE — G8417 CALC BMI ABV UP PARAM F/U: HCPCS | Performed by: NURSE PRACTITIONER

## 2023-01-18 PROCEDURE — 1036F TOBACCO NON-USER: CPT | Performed by: NURSE PRACTITIONER

## 2023-01-18 PROCEDURE — 3079F DIAST BP 80-89 MM HG: CPT | Performed by: NURSE PRACTITIONER

## 2023-01-18 PROCEDURE — 99214 OFFICE O/P EST MOD 30 MIN: CPT | Performed by: NURSE PRACTITIONER

## 2023-01-18 PROCEDURE — 93000 ELECTROCARDIOGRAM COMPLETE: CPT | Performed by: NURSE PRACTITIONER

## 2023-01-18 PROCEDURE — 3075F SYST BP GE 130 - 139MM HG: CPT | Performed by: NURSE PRACTITIONER

## 2023-01-18 RX ORDER — HYDROCODONE BITARTRATE AND ACETAMINOPHEN 5; 325 MG/1; MG/1
1 TABLET ORAL 2 TIMES DAILY PRN
COMMUNITY
Start: 2022-12-17 | End: 2023-02-16

## 2023-01-18 RX ORDER — AMOXICILLIN AND CLAVULANATE POTASSIUM 875; 125 MG/1; MG/1
1 TABLET, FILM COATED ORAL 2 TIMES DAILY
Qty: 14 TABLET | Refills: 0 | Status: SHIPPED | OUTPATIENT
Start: 2023-01-18 | End: 2023-01-25

## 2023-01-18 RX ORDER — TRIAMCINOLONE ACETONIDE 55 UG/1
2 SPRAY, METERED NASAL DAILY
Qty: 1 EACH | Refills: 0 | Status: SHIPPED | OUTPATIENT
Start: 2023-01-18

## 2023-01-18 ASSESSMENT — ENCOUNTER SYMPTOMS
VOICE CHANGE: 0
TROUBLE SWALLOWING: 0
SORE THROAT: 0
RHINORRHEA: 0
SINUS PAIN: 1
GASTROINTESTINAL NEGATIVE: 1
SINUS PRESSURE: 1
EYES NEGATIVE: 1
RESPIRATORY NEGATIVE: 1
FACIAL SWELLING: 0

## 2023-01-18 ASSESSMENT — PATIENT HEALTH QUESTIONNAIRE - PHQ9
2. FEELING DOWN, DEPRESSED OR HOPELESS: 0
1. LITTLE INTEREST OR PLEASURE IN DOING THINGS: 0
7. TROUBLE CONCENTRATING ON THINGS, SUCH AS READING THE NEWSPAPER OR WATCHING TELEVISION: 0
SUM OF ALL RESPONSES TO PHQ QUESTIONS 1-9: 2
SUM OF ALL RESPONSES TO PHQ QUESTIONS 1-9: 2
3. TROUBLE FALLING OR STAYING ASLEEP: 2
9. THOUGHTS THAT YOU WOULD BE BETTER OFF DEAD, OR OF HURTING YOURSELF: 0
10. IF YOU CHECKED OFF ANY PROBLEMS, HOW DIFFICULT HAVE THESE PROBLEMS MADE IT FOR YOU TO DO YOUR WORK, TAKE CARE OF THINGS AT HOME, OR GET ALONG WITH OTHER PEOPLE: 0
SUM OF ALL RESPONSES TO PHQ QUESTIONS 1-9: 2
8. MOVING OR SPEAKING SO SLOWLY THAT OTHER PEOPLE COULD HAVE NOTICED. OR THE OPPOSITE, BEING SO FIGETY OR RESTLESS THAT YOU HAVE BEEN MOVING AROUND A LOT MORE THAN USUAL: 0
SUM OF ALL RESPONSES TO PHQ9 QUESTIONS 1 & 2: 0
SUM OF ALL RESPONSES TO PHQ QUESTIONS 1-9: 2
5. POOR APPETITE OR OVEREATING: 0
4. FEELING TIRED OR HAVING LITTLE ENERGY: 0
6. FEELING BAD ABOUT YOURSELF - OR THAT YOU ARE A FAILURE OR HAVE LET YOURSELF OR YOUR FAMILY DOWN: 0

## 2023-01-18 ASSESSMENT — ANXIETY QUESTIONNAIRES
IF YOU CHECKED OFF ANY PROBLEMS ON THIS QUESTIONNAIRE, HOW DIFFICULT HAVE THESE PROBLEMS MADE IT FOR YOU TO DO YOUR WORK, TAKE CARE OF THINGS AT HOME, OR GET ALONG WITH OTHER PEOPLE: NOT DIFFICULT AT ALL
4. TROUBLE RELAXING: 1
2. NOT BEING ABLE TO STOP OR CONTROL WORRYING: 1
3. WORRYING TOO MUCH ABOUT DIFFERENT THINGS: 0
6. BECOMING EASILY ANNOYED OR IRRITABLE: 0
5. BEING SO RESTLESS THAT IT IS HARD TO SIT STILL: 0
7. FEELING AFRAID AS IF SOMETHING AWFUL MIGHT HAPPEN: 2
1. FEELING NERVOUS, ANXIOUS, OR ON EDGE: 0
GAD7 TOTAL SCORE: 4

## 2023-01-18 NOTE — PROGRESS NOTES
2023     Geovanna Henderson (:  1980) is a 36 y.o. male, here for evaluation of the following medical concerns:    HPI    Pt c/o frontal sinus pressure with PND for the past 1-2 weeks. States that the pressure is worse when he bends over. Denies fever, cough or chills. He has tried Ibuprofen for the headache which helps some. Pt is also concerned about rapid heart rate. States that he only gets these symptoms when he lies down to go to bed at night. Symptoms last about 30 minutes and go away on their own. Denies CP, SOB or dizziness. Denies increased stress or anxiety. Denies alcohol, drug or tobacco use. Denies any new medications. States that he drinks 3-4 glasses of sweet tea everyday. Review of Systems   Constitutional: Negative. HENT:  Positive for postnasal drip, sinus pressure and sinus pain. Negative for congestion, ear discharge, ear pain, facial swelling, hearing loss, mouth sores, nosebleeds, rhinorrhea, sneezing, sore throat, tinnitus, trouble swallowing and voice change. Eyes: Negative. Respiratory: Negative. Cardiovascular:  Positive for palpitations. Negative for chest pain and leg swelling. Gastrointestinal: Negative. Endocrine: Negative. Neurological:  Positive for headaches. Negative for dizziness, tremors, seizures, syncope, facial asymmetry, speech difficulty, weakness, light-headedness and numbness. Psychiatric/Behavioral: Negative. Prior to Visit Medications    Medication Sig Taking? Authorizing Provider   HYDROcodone-acetaminophen (NORCO) 5-325 MG per tablet Take 1 tablet by mouth 2 times daily as needed.  Yes Historical Provider, MD   triamcinolone (NASACORT) 55 MCG/ACT nasal inhaler 2 sprays by Each Nostril route daily Yes ODALYS Serra CNP   amoxicillin-clavulanate (AUGMENTIN) 875-125 MG per tablet Take 1 tablet by mouth 2 times daily for 7 days Yes ODALYS Serra CNP   methocarbamol (ROBAXIN) 750 MG tablet Yes Historical Provider, MD   esomeprazole Magnesium (NEXIUM) 40 MG PACK Take 40 mg by mouth daily. Yes Historical Provider, MD        Social History     Tobacco Use    Smoking status: Former     Packs/day: 0.10     Years: 15.00     Pack years: 1.50     Types: Cigarettes    Smokeless tobacco: Former    Tobacco comments:     has been weaning down, now less than 1 pack a week   Substance Use Topics    Alcohol use: Yes     Comment: rare; special occasions only        Vitals:    01/18/23 1001   BP: 136/88   Site: Right Upper Arm   Position: Sitting   Cuff Size: Medium Adult   Pulse: 81   SpO2: 97%   Weight: 229 lb (103.9 kg)   Height: 5' 11.5\" (1.816 m)     Estimated body mass index is 31.49 kg/m² as calculated from the following:    Height as of this encounter: 5' 11.5\" (1.816 m). Weight as of this encounter: 229 lb (103.9 kg). Physical Exam  Vitals reviewed. Constitutional:       General: He is not in acute distress. Appearance: Normal appearance. He is not ill-appearing or diaphoretic. HENT:      Head: Normocephalic and atraumatic. Right Ear: Hearing, ear canal and external ear normal. No decreased hearing noted. No laceration, drainage, swelling or tenderness. A middle ear effusion is present. There is no impacted cerumen. Tympanic membrane is not perforated, erythematous or bulging. Left Ear: Hearing, ear canal and external ear normal. No decreased hearing noted. No laceration, drainage, swelling or tenderness. A middle ear effusion is present. There is no impacted cerumen. Tympanic membrane is not perforated, erythematous or bulging. Nose:      Right Turbinates: Swollen. Left Turbinates: Swollen. Right Sinus: Frontal sinus tenderness present. No maxillary sinus tenderness. Left Sinus: Frontal sinus tenderness present. No maxillary sinus tenderness. Cardiovascular:      Rate and Rhythm: Normal rate and regular rhythm. Heart sounds: Normal heart sounds.    Pulmonary: Effort: Pulmonary effort is normal.      Breath sounds: Normal breath sounds. Musculoskeletal:      Cervical back: Normal range of motion. Skin:     General: Skin is warm and dry. Neurological:      General: No focal deficit present. Mental Status: He is alert and oriented to person, place, and time. Psychiatric:         Mood and Affect: Mood normal.         Behavior: Behavior normal.         Thought Content: Thought content normal.         Judgment: Judgment normal.       ASSESSMENT/PLAN:  1. Acute frontal sinusitis, recurrence not specified  Advised pt to use Nasacort as directed for at least 10-14 days. Take antibiotic as directed with food to prevent GI upset. F/u if symptoms persist or worsen. - triamcinolone (NASACORT) 55 MCG/ACT nasal inhaler; 2 sprays by Each Nostril route daily  Dispense: 1 each; Refill: 0  - amoxicillin-clavulanate (AUGMENTIN) 875-125 MG per tablet; Take 1 tablet by mouth 2 times daily for 7 days  Dispense: 14 tablet; Refill: 0    2. Rapid resting heart rate  EKG showed NSR. Will order labs and 48 hour holter monitor. Advised pt that I would f/u with regarding results. I also recommended that he cut back on his caffeine intake to see if symptoms resolve. Pt VU and agrees with POC.      - Holter Monitor 48 Hour; Future  - T4, Free  - TSH  - Comprehensive Metabolic Panel; Future  - Magnesium; Future  - EKG 12 Lead - Clinic Performed    Return for Annual Physical - with PCP. An electronic signature was used to authenticate this note.     --ODALYS Davison - CNP on 1/18/2023 at 12:14 PM    I have spent 30+ minutes with this pt encounter

## 2023-01-18 NOTE — Clinical Note
Just an FYI. It looks like he has not seen you in almost 3 years. Leilani Denson will be clients assigned nurse .

## 2023-02-14 DIAGNOSIS — E03.8 SUBCLINICAL HYPOTHYROIDISM: Primary | ICD-10-CM

## 2023-02-14 RX ORDER — LEVOTHYROXINE SODIUM 0.03 MG/1
25 TABLET ORAL DAILY
Qty: 90 TABLET | Refills: 1 | Status: SHIPPED | OUTPATIENT
Start: 2023-02-14

## 2023-02-21 ENCOUNTER — OFFICE VISIT (OUTPATIENT)
Dept: FAMILY MEDICINE CLINIC | Age: 43
End: 2023-02-21
Payer: COMMERCIAL

## 2023-02-21 VITALS
WEIGHT: 225.8 LBS | DIASTOLIC BLOOD PRESSURE: 80 MMHG | HEART RATE: 70 BPM | OXYGEN SATURATION: 97 % | SYSTOLIC BLOOD PRESSURE: 124 MMHG | BODY MASS INDEX: 31.05 KG/M2

## 2023-02-21 DIAGNOSIS — L02.91 ABSCESS: ICD-10-CM

## 2023-02-21 DIAGNOSIS — E03.9 HYPOTHYROIDISM, UNSPECIFIED TYPE: Primary | ICD-10-CM

## 2023-02-21 PROCEDURE — 3074F SYST BP LT 130 MM HG: CPT | Performed by: NURSE PRACTITIONER

## 2023-02-21 PROCEDURE — G8417 CALC BMI ABV UP PARAM F/U: HCPCS | Performed by: NURSE PRACTITIONER

## 2023-02-21 PROCEDURE — G8427 DOCREV CUR MEDS BY ELIG CLIN: HCPCS | Performed by: NURSE PRACTITIONER

## 2023-02-21 PROCEDURE — G8484 FLU IMMUNIZE NO ADMIN: HCPCS | Performed by: NURSE PRACTITIONER

## 2023-02-21 PROCEDURE — 99213 OFFICE O/P EST LOW 20 MIN: CPT | Performed by: NURSE PRACTITIONER

## 2023-02-21 PROCEDURE — 1036F TOBACCO NON-USER: CPT | Performed by: NURSE PRACTITIONER

## 2023-02-21 PROCEDURE — 3079F DIAST BP 80-89 MM HG: CPT | Performed by: NURSE PRACTITIONER

## 2023-02-21 RX ORDER — DOXYCYCLINE HYCLATE 100 MG
100 TABLET ORAL 2 TIMES DAILY
Qty: 14 TABLET | Refills: 0 | Status: SHIPPED | OUTPATIENT
Start: 2023-02-21 | End: 2023-02-28

## 2023-02-21 RX ORDER — HYDROCODONE BITARTRATE AND ACETAMINOPHEN 5; 325 MG/1; MG/1
1 TABLET ORAL 2 TIMES DAILY PRN
COMMUNITY
Start: 2023-02-16 | End: 2023-03-18

## 2023-02-21 ASSESSMENT — ANXIETY QUESTIONNAIRES
IF YOU CHECKED OFF ANY PROBLEMS ON THIS QUESTIONNAIRE, HOW DIFFICULT HAVE THESE PROBLEMS MADE IT FOR YOU TO DO YOUR WORK, TAKE CARE OF THINGS AT HOME, OR GET ALONG WITH OTHER PEOPLE: NOT DIFFICULT AT ALL
3. WORRYING TOO MUCH ABOUT DIFFERENT THINGS: 1
6. BECOMING EASILY ANNOYED OR IRRITABLE: 0
1. FEELING NERVOUS, ANXIOUS, OR ON EDGE: 1
5. BEING SO RESTLESS THAT IT IS HARD TO SIT STILL: 0
GAD7 TOTAL SCORE: 5
7. FEELING AFRAID AS IF SOMETHING AWFUL MIGHT HAPPEN: 1
2. NOT BEING ABLE TO STOP OR CONTROL WORRYING: 1
4. TROUBLE RELAXING: 1

## 2023-02-21 ASSESSMENT — PATIENT HEALTH QUESTIONNAIRE - PHQ9
9. THOUGHTS THAT YOU WOULD BE BETTER OFF DEAD, OR OF HURTING YOURSELF: 0
10. IF YOU CHECKED OFF ANY PROBLEMS, HOW DIFFICULT HAVE THESE PROBLEMS MADE IT FOR YOU TO DO YOUR WORK, TAKE CARE OF THINGS AT HOME, OR GET ALONG WITH OTHER PEOPLE: 0
SUM OF ALL RESPONSES TO PHQ9 QUESTIONS 1 & 2: 0
SUM OF ALL RESPONSES TO PHQ QUESTIONS 1-9: 2
1. LITTLE INTEREST OR PLEASURE IN DOING THINGS: 0
SUM OF ALL RESPONSES TO PHQ QUESTIONS 1-9: 2
8. MOVING OR SPEAKING SO SLOWLY THAT OTHER PEOPLE COULD HAVE NOTICED. OR THE OPPOSITE, BEING SO FIGETY OR RESTLESS THAT YOU HAVE BEEN MOVING AROUND A LOT MORE THAN USUAL: 0
4. FEELING TIRED OR HAVING LITTLE ENERGY: 0
SUM OF ALL RESPONSES TO PHQ QUESTIONS 1-9: 2
3. TROUBLE FALLING OR STAYING ASLEEP: 2
2. FEELING DOWN, DEPRESSED OR HOPELESS: 0
6. FEELING BAD ABOUT YOURSELF - OR THAT YOU ARE A FAILURE OR HAVE LET YOURSELF OR YOUR FAMILY DOWN: 0
5. POOR APPETITE OR OVEREATING: 0
7. TROUBLE CONCENTRATING ON THINGS, SUCH AS READING THE NEWSPAPER OR WATCHING TELEVISION: 0
SUM OF ALL RESPONSES TO PHQ QUESTIONS 1-9: 2

## 2023-02-21 NOTE — PROGRESS NOTES
2023     Carie Reyna (:  1980) is a 36 y.o. male, here for evaluation of the following medical concerns:    HPI    Pt is here today to discuss results from [address] on 2023 for rapid resting HR. EKG showed NSR with a rate of 72 bpm.      He did not get the 48 hour holter monitor test yet. He states that he did cut back on caffeine and is down to 1 cup of coffee in the mornings. Symptoms were occurring daily, but are now only occurring 2-3 days per week. TSH on 23 was elevated at 10.51. We repeated labs on 23 and TSH was still elevated at 9.56. T4 was normal.  JACQUE Garcia started the pt on Synthroid 25 mcg daily one week ago. Pt states that he is taking it as directed. Denies any ill side effects. Pt is also concerned about possible abscess to the back of his neck. States that he noticed it last week, and it has continued to get larger and more painful. Denies fever, warmth or drainage. Review of Systems   All other systems reviewed and are negative. Prior to Visit Medications    Medication Sig Taking? Authorizing Provider   HYDROcodone-acetaminophen (NORCO) 5-325 MG per tablet Take 1 tablet by mouth 2 times daily as needed. Yes Historical Provider, MD   doxycycline hyclate (VIBRA-TABS) 100 MG tablet Take 1 tablet by mouth 2 times daily for 7 days Yes ODALYS Paul CNP   levothyroxine (SYNTHROID) 25 MCG tablet Take 1 tablet by mouth daily Yes JACQUE Ashley   triamcinolone (NASACORT) 55 MCG/ACT nasal inhaler 2 sprays by Each Nostril route daily Yes ODALYS Paul CNP   methocarbamol (ROBAXIN) 750 MG tablet  Yes Historical Provider, MD   esomeprazole Magnesium (NEXIUM) 40 MG PACK Take 40 mg by mouth daily.  Yes Historical Provider, MD        Social History     Tobacco Use    Smoking status: Former     Packs/day: 0.10     Years: 15.00     Pack years: 1.50     Types: Cigarettes    Smokeless tobacco: Former    Tobacco comments:     has been weaning down, now less than 1 pack a week   Substance Use Topics    Alcohol use: Yes     Comment: rare; special occasions only        Vitals:    02/21/23 1003   BP: 124/80   Site: Right Upper Arm   Position: Sitting   Cuff Size: Large Adult   Pulse: 70   SpO2: 97%   Weight: 225 lb 12.8 oz (102.4 kg)     Estimated body mass index is 31.05 kg/m² as calculated from the following:    Height as of 1/18/23: 5' 11.5\" (1.816 m). Weight as of this encounter: 225 lb 12.8 oz (102.4 kg). Physical Exam  Vitals reviewed. Constitutional:       Appearance: Normal appearance. HENT:      Head: Normocephalic and atraumatic. Neck:      Thyroid: No thyroid mass, thyromegaly or thyroid tenderness. Cardiovascular:      Rate and Rhythm: Normal rate and regular rhythm. Pulses: Normal pulses. Heart sounds: Normal heart sounds. Pulmonary:      Effort: Pulmonary effort is normal.      Breath sounds: Normal breath sounds. Musculoskeletal:      Cervical back: Normal range of motion. Skin:     Findings: Abscess present. Neurological:      Mental Status: He is alert and oriented to person, place, and time. Psychiatric:         Mood and Affect: Mood normal.         Behavior: Behavior normal.         Thought Content: Thought content normal.         Judgment: Judgment normal.       ASSESSMENT/PLAN:  1. Hypothyroidism, unspecified type  See HPI. Pt started synthroid 25 mcg daily about a week ago. Pt will return for f/u labs in about 10 weeks. Due for routine annual physical with PCP. Encouraged pt to schedule this today. 2. Abscess  Encouraged pt to take antibiotic as directed. Apply wet warm compresses several times a day. F/u if symptoms persist or worsen. - doxycycline hyclate (VIBRA-TABS) 100 MG tablet; Take 1 tablet by mouth 2 times daily for 7 days  Dispense: 14 tablet; Refill: 0    Return for Annual Physical - with PCP.     An electronic signature was used to authenticate this note.    --ODALYS Plascencia - CNP on 2/21/2023 at 11:10 AM

## 2023-02-23 DIAGNOSIS — L02.91 ABSCESS: Primary | ICD-10-CM

## 2023-07-05 ENCOUNTER — OFFICE VISIT (OUTPATIENT)
Dept: FAMILY MEDICINE CLINIC | Age: 43
End: 2023-07-05
Payer: COMMERCIAL

## 2023-07-05 VITALS
SYSTOLIC BLOOD PRESSURE: 135 MMHG | OXYGEN SATURATION: 99 % | DIASTOLIC BLOOD PRESSURE: 75 MMHG | HEIGHT: 72 IN | BODY MASS INDEX: 29.93 KG/M2 | HEART RATE: 76 BPM | WEIGHT: 221 LBS

## 2023-07-05 DIAGNOSIS — M54.42 CHRONIC BILATERAL LOW BACK PAIN WITH LEFT-SIDED SCIATICA: ICD-10-CM

## 2023-07-05 DIAGNOSIS — R00.0 RAPID RESTING HEART RATE: ICD-10-CM

## 2023-07-05 DIAGNOSIS — K21.9 GASTROESOPHAGEAL REFLUX DISEASE WITHOUT ESOPHAGITIS: ICD-10-CM

## 2023-07-05 DIAGNOSIS — G89.29 CHRONIC BILATERAL LOW BACK PAIN WITH LEFT-SIDED SCIATICA: ICD-10-CM

## 2023-07-05 DIAGNOSIS — E03.8 SUBCLINICAL HYPOTHYROIDISM: ICD-10-CM

## 2023-07-05 DIAGNOSIS — E03.9 HYPOTHYROIDISM, UNSPECIFIED TYPE: Primary | ICD-10-CM

## 2023-07-05 LAB
T4 FREE SERPL-MCNC: 1.6 NG/DL (ref 0.9–1.8)
TSH SERPL DL<=0.005 MIU/L-ACNC: 7.52 UIU/ML (ref 0.27–4.2)

## 2023-07-05 PROCEDURE — G8427 DOCREV CUR MEDS BY ELIG CLIN: HCPCS | Performed by: FAMILY MEDICINE

## 2023-07-05 PROCEDURE — 3078F DIAST BP <80 MM HG: CPT | Performed by: FAMILY MEDICINE

## 2023-07-05 PROCEDURE — G8417 CALC BMI ABV UP PARAM F/U: HCPCS | Performed by: FAMILY MEDICINE

## 2023-07-05 PROCEDURE — 3075F SYST BP GE 130 - 139MM HG: CPT | Performed by: FAMILY MEDICINE

## 2023-07-05 PROCEDURE — 1036F TOBACCO NON-USER: CPT | Performed by: FAMILY MEDICINE

## 2023-07-05 PROCEDURE — 99214 OFFICE O/P EST MOD 30 MIN: CPT | Performed by: FAMILY MEDICINE

## 2023-07-05 RX ORDER — HYDROCODONE BITARTRATE AND ACETAMINOPHEN 5; 325 MG/1; MG/1
1 TABLET ORAL 2 TIMES DAILY PRN
COMMUNITY
Start: 2023-06-16 | End: 2023-08-13

## 2023-07-05 ASSESSMENT — ENCOUNTER SYMPTOMS
ABDOMINAL PAIN: 0
BACK PAIN: 1
CHEST TIGHTNESS: 0
BLOOD IN STOOL: 0
SORE THROAT: 0
RHINORRHEA: 0
COUGH: 0
CONSTIPATION: 0
SHORTNESS OF BREATH: 0

## 2023-07-05 ASSESSMENT — PATIENT HEALTH QUESTIONNAIRE - PHQ9
SUM OF ALL RESPONSES TO PHQ QUESTIONS 1-9: 1
DEPRESSION UNABLE TO ASSESS: FUNCTIONAL CAPACITY MOTIVATION LIMITS ACCURACY
2. FEELING DOWN, DEPRESSED OR HOPELESS: 0
SUM OF ALL RESPONSES TO PHQ QUESTIONS 1-9: 1
SUM OF ALL RESPONSES TO PHQ9 QUESTIONS 1 & 2: 1
SUM OF ALL RESPONSES TO PHQ QUESTIONS 1-9: 1
1. LITTLE INTEREST OR PLEASURE IN DOING THINGS: 1
SUM OF ALL RESPONSES TO PHQ QUESTIONS 1-9: 1

## 2023-07-05 ASSESSMENT — ANXIETY QUESTIONNAIRES
4. TROUBLE RELAXING: 2
IF YOU CHECKED OFF ANY PROBLEMS ON THIS QUESTIONNAIRE, HOW DIFFICULT HAVE THESE PROBLEMS MADE IT FOR YOU TO DO YOUR WORK, TAKE CARE OF THINGS AT HOME, OR GET ALONG WITH OTHER PEOPLE: NOT DIFFICULT AT ALL
6. BECOMING EASILY ANNOYED OR IRRITABLE: 0
GAD7 TOTAL SCORE: 7
5. BEING SO RESTLESS THAT IT IS HARD TO SIT STILL: 0
3. WORRYING TOO MUCH ABOUT DIFFERENT THINGS: 1
2. NOT BEING ABLE TO STOP OR CONTROL WORRYING: 2
7. FEELING AFRAID AS IF SOMETHING AWFUL MIGHT HAPPEN: 1
1. FEELING NERVOUS, ANXIOUS, OR ON EDGE: 1

## 2023-07-05 NOTE — PROGRESS NOTES
Subjective:      Patient ID: Mercedes Groves is a 36 y.o. male. HPI  Patient in for 6-month checkup for several medical issues. At last visit he had mentioned that he was having rapid heartbeats when he lays down at night only and it can take a while for him to fall asleep-at that time the onset was about 2 or 3 months. He had blood work which showed hypothyroidism and had a Holter monitor ordered but has not been done yet. He denies any chest pain or shortness of breath and states that he has to set up or get up for to stop. He has chronic low back pain for which she is taking medication and GERD-also taking medication. He states overall besides the rapid heartbeat he is doing very well. States that he smokes possibly 2 cigarettes a week. Review of Systems    Review of Systems   Constitutional:  Negative for unexpected weight change. HENT:  Negative for congestion, postnasal drip, rhinorrhea and sore throat. Eyes:  Negative for visual disturbance. Respiratory:  Negative for cough, chest tightness and shortness of breath. Cardiovascular:  Positive for palpitations. Negative for chest pain and leg swelling. See HPI   Gastrointestinal:  Negative for abdominal pain, blood in stool and constipation. No gerd   Genitourinary:  Negative for dysuria, frequency and hematuria. No nocturia   Musculoskeletal:  Positive for back pain. Negative for arthralgias and myalgias. Skin:  Negative for pallor and rash. Neurological:  Negative for tremors, syncope and headaches. Psychiatric/Behavioral:  Negative for sleep disturbance. The patient is not nervous/anxious. Objective:   Physical Exam      Physical Exam  Constitutional:       General: He is not in acute distress. Appearance: Normal appearance. He is well-developed and normal weight. HENT:      Head: Normocephalic. Mouth/Throat:      Mouth: Mucous membranes are moist.      Pharynx: Oropharynx is clear.    Eyes:

## 2023-07-25 ENCOUNTER — HOSPITAL ENCOUNTER (OUTPATIENT)
Dept: NON INVASIVE DIAGNOSTICS | Age: 43
Discharge: HOME | End: 2023-07-25
Payer: COMMERCIAL

## 2023-07-25 DIAGNOSIS — R00.0 RAPID RESTING HEART RATE: ICD-10-CM

## 2023-07-25 PROCEDURE — 93226 XTRNL ECG REC<48 HR SCAN A/R: CPT

## 2023-07-25 PROCEDURE — 93225 XTRNL ECG REC<48 HRS REC: CPT

## 2023-08-01 LAB
ACQUISITION DURATION: NORMAL S
AVERAGE HEART RATE: 78 BPM
EKG DIAGNOSIS: NORMAL
HOLTER MAX HEART RATE: 142 BPM
HOOKUP DATE: NORMAL
HOOKUP TIME: NORMAL
MAX HEART RATE TIME/DATE: NORMAL
MIN HEART RATE TIME/DATE: NORMAL
MIN HEART RATE: 49 BPM
NUMBER OF QRS COMPLEXES: NORMAL
NUMBER OF SUPRAVENTRICULAR COUPLETS: 0
NUMBER OF SUPRAVENTRICULAR ECTOPICS: 4
NUMBER OF SUPRAVENTRICULAR ISOLATED BEATS: 4
NUMBER OF VENTRICULAR BIGEMINAL CYCLES: 0
NUMBER OF VENTRICULAR COUPLETS: 0
NUMBER OF VENTRICULAR ECTOPICS: 0

## 2023-08-16 DIAGNOSIS — E03.8 SUBCLINICAL HYPOTHYROIDISM: ICD-10-CM

## 2023-08-16 RX ORDER — LEVOTHYROXINE SODIUM 0.03 MG/1
TABLET ORAL
Qty: 90 TABLET | Refills: 1 | Status: SHIPPED | OUTPATIENT
Start: 2023-08-16

## 2023-08-16 NOTE — TELEPHONE ENCOUNTER
.Refill Request     CONFIRM preferred pharmacy with the patient. If Mail Order Rx - Pend for 90 day refill. Last Seen: Last Seen Department: 7/5/2023  Last Seen by PCP: Visit date not found    Last Written: 2-14-23 90 with 1     If no future appointment scheduled:  Review the last OV with PCP and review information for follow-up visit,  Route STAFF MESSAGE with patient name to the [facility] for scheduling with the following information:            -  Timing of next visit           -  Visit type ie Physical, OV, etc           -  Diagnoses/Reason ie. COPD, HTN - Do not use MEDICATION, Follow-up or CHECK UP - Give reason for visit      Next Appointment:   Future Appointments   Date Time Provider [address]   7/8/2024 10:00 AM DO BIANCA Barrios [place] [facility] [place]       Message sent to [employer] to schedule appt with patient?   N/A      Requested Prescriptions     Pending Prescriptions Disp Refills    levothyroxine (SYNTHROID) 25 MCG tablet [Pharmacy Med Name: LEVOTHYROXINE 25 MCG TABLET] 90 tablet 1     Sig: TAKE 1 TABLET BY MOUTH EVERY DAY

## 2023-08-23 ENCOUNTER — OFFICE VISIT (OUTPATIENT)
Dept: FAMILY MEDICINE CLINIC | Age: 43
End: 2023-08-23
Payer: COMMERCIAL

## 2023-08-23 VITALS — SYSTOLIC BLOOD PRESSURE: 140 MMHG | HEART RATE: 58 BPM | OXYGEN SATURATION: 98 % | DIASTOLIC BLOOD PRESSURE: 80 MMHG

## 2023-08-23 DIAGNOSIS — I10 DIASTOLIC HYPERTENSION: Primary | ICD-10-CM

## 2023-08-23 DIAGNOSIS — R00.0 RAPID RESTING HEART RATE: ICD-10-CM

## 2023-08-23 DIAGNOSIS — K21.9 GASTROESOPHAGEAL REFLUX DISEASE WITHOUT ESOPHAGITIS: ICD-10-CM

## 2023-08-23 DIAGNOSIS — E03.9 HYPOTHYROIDISM, UNSPECIFIED TYPE: ICD-10-CM

## 2023-08-23 DIAGNOSIS — I10 DIASTOLIC HYPERTENSION: ICD-10-CM

## 2023-08-23 LAB
ALBUMIN SERPL-MCNC: 4.8 G/DL (ref 3.4–5)
ALBUMIN/GLOB SERPL: 1.7 {RATIO} (ref 1.1–2.2)
ALP SERPL-CCNC: 74 U/L (ref 40–129)
ALT SERPL-CCNC: 19 U/L (ref 10–40)
ANION GAP SERPL CALCULATED.3IONS-SCNC: 14 MMOL/L (ref 3–16)
AST SERPL-CCNC: 19 U/L (ref 15–37)
BASOPHILS # BLD: 0.1 K/UL (ref 0–0.2)
BASOPHILS NFR BLD: 0.7 %
BILIRUB SERPL-MCNC: 0.3 MG/DL (ref 0–1)
BUN SERPL-MCNC: 13 MG/DL (ref 7–20)
CALCIUM SERPL-MCNC: 9.9 MG/DL (ref 8.3–10.6)
CHLORIDE SERPL-SCNC: 102 MMOL/L (ref 99–110)
CHOLEST SERPL-MCNC: 185 MG/DL (ref 0–199)
CO2 SERPL-SCNC: 26 MMOL/L (ref 21–32)
CREAT SERPL-MCNC: 1.4 MG/DL (ref 0.9–1.3)
DEPRECATED RDW RBC AUTO: 13.6 % (ref 12.4–15.4)
EOSINOPHIL # BLD: 0.3 K/UL (ref 0–0.6)
EOSINOPHIL NFR BLD: 2.9 %
GFR SERPLBLD CREATININE-BSD FMLA CKD-EPI: >60 ML/MIN/{1.73_M2}
GLUCOSE SERPL-MCNC: 89 MG/DL (ref 70–99)
HCT VFR BLD AUTO: 46.1 % (ref 40.5–52.5)
HDLC SERPL-MCNC: 42 MG/DL (ref 40–60)
HGB BLD-MCNC: 15.5 G/DL (ref 13.5–17.5)
LDLC SERPL CALC-MCNC: 122 MG/DL
LYMPHOCYTES # BLD: 2.4 K/UL (ref 1–5.1)
LYMPHOCYTES NFR BLD: 24.9 %
MCH RBC QN AUTO: 30.1 PG (ref 26–34)
MCHC RBC AUTO-ENTMCNC: 33.6 G/DL (ref 31–36)
MCV RBC AUTO: 89.4 FL (ref 80–100)
MONOCYTES # BLD: 0.8 K/UL (ref 0–1.3)
MONOCYTES NFR BLD: 8.5 %
NEUTROPHILS # BLD: 6.1 K/UL (ref 1.7–7.7)
NEUTROPHILS NFR BLD: 63 %
PLATELET # BLD AUTO: 186 K/UL (ref 135–450)
PMV BLD AUTO: 10.3 FL (ref 5–10.5)
POTASSIUM SERPL-SCNC: 4.5 MMOL/L (ref 3.5–5.1)
PROT SERPL-MCNC: 7.6 G/DL (ref 6.4–8.2)
RBC # BLD AUTO: 5.16 M/UL (ref 4.2–5.9)
SODIUM SERPL-SCNC: 142 MMOL/L (ref 136–145)
TRIGL SERPL-MCNC: 103 MG/DL (ref 0–150)
VLDLC SERPL CALC-MCNC: 21 MG/DL
WBC # BLD AUTO: 9.6 K/UL (ref 4–11)

## 2023-08-23 PROCEDURE — 3074F SYST BP LT 130 MM HG: CPT | Performed by: FAMILY MEDICINE

## 2023-08-23 PROCEDURE — 1036F TOBACCO NON-USER: CPT | Performed by: FAMILY MEDICINE

## 2023-08-23 PROCEDURE — G8417 CALC BMI ABV UP PARAM F/U: HCPCS | Performed by: FAMILY MEDICINE

## 2023-08-23 PROCEDURE — 3078F DIAST BP <80 MM HG: CPT | Performed by: FAMILY MEDICINE

## 2023-08-23 PROCEDURE — G8427 DOCREV CUR MEDS BY ELIG CLIN: HCPCS | Performed by: FAMILY MEDICINE

## 2023-08-23 PROCEDURE — 99214 OFFICE O/P EST MOD 30 MIN: CPT | Performed by: FAMILY MEDICINE

## 2023-08-23 RX ORDER — HYDROCODONE BITARTRATE AND ACETAMINOPHEN 5; 325 MG/1; MG/1
1 TABLET ORAL 2 TIMES DAILY PRN
COMMUNITY
Start: 2023-08-16 | End: 2023-09-15

## 2023-09-03 ASSESSMENT — ENCOUNTER SYMPTOMS
CONSTIPATION: 0
CHEST TIGHTNESS: 0
COUGH: 0
SORE THROAT: 0
RHINORRHEA: 0
SHORTNESS OF BREATH: 0
ABDOMINAL PAIN: 0
BLOOD IN STOOL: 0

## 2023-09-07 ENCOUNTER — OFFICE VISIT (OUTPATIENT)
Dept: CARDIOLOGY CLINIC | Age: 43
End: 2023-09-07
Payer: COMMERCIAL

## 2023-09-07 VITALS
HEART RATE: 80 BPM | SYSTOLIC BLOOD PRESSURE: 122 MMHG | HEIGHT: 72 IN | WEIGHT: 222.8 LBS | BODY MASS INDEX: 30.18 KG/M2 | OXYGEN SATURATION: 97 % | DIASTOLIC BLOOD PRESSURE: 78 MMHG

## 2023-09-07 DIAGNOSIS — I49.9 IRREGULAR HEART RATE: Primary | ICD-10-CM

## 2023-09-07 DIAGNOSIS — Z82.49 FAMILY HISTORY OF EARLY CAD: ICD-10-CM

## 2023-09-07 DIAGNOSIS — R42 DIZZINESS: ICD-10-CM

## 2023-09-07 DIAGNOSIS — E03.9 HYPOTHYROIDISM, UNSPECIFIED TYPE: ICD-10-CM

## 2023-09-07 DIAGNOSIS — R00.2 PALPITATIONS: ICD-10-CM

## 2023-09-07 DIAGNOSIS — Z87.891 FORMER SMOKER: ICD-10-CM

## 2023-09-07 DIAGNOSIS — I10 DIASTOLIC HYPERTENSION: ICD-10-CM

## 2023-09-07 PROCEDURE — 99203 OFFICE O/P NEW LOW 30 MIN: CPT | Performed by: STUDENT IN AN ORGANIZED HEALTH CARE EDUCATION/TRAINING PROGRAM

## 2023-09-07 PROCEDURE — 93000 ELECTROCARDIOGRAM COMPLETE: CPT | Performed by: STUDENT IN AN ORGANIZED HEALTH CARE EDUCATION/TRAINING PROGRAM

## 2023-09-07 PROCEDURE — 3074F SYST BP LT 130 MM HG: CPT | Performed by: STUDENT IN AN ORGANIZED HEALTH CARE EDUCATION/TRAINING PROGRAM

## 2023-09-07 PROCEDURE — 3078F DIAST BP <80 MM HG: CPT | Performed by: STUDENT IN AN ORGANIZED HEALTH CARE EDUCATION/TRAINING PROGRAM

## 2023-09-07 NOTE — PATIENT INSTRUCTIONS
Your provider has ordered testing for further evaluation. An order/prescription has been included in your paper work. To schedule outpatient testing, contact Central Scheduling by calling [employer] ([phone]). Patient Plan:  EKG reviewed  Order lab LPA (lipoprotein A) ~ extensive family history of coronary artery disease  3. Order cardiac event monitor ~ palpitations 2 weeks [employer]   4. Order echocardiogram ~ dizziness, hypertension   5. Consider CT calcium score ~ family history of coronary disease and former smoker  6.     Follow up 3-4 months

## 2023-09-07 NOTE — PROGRESS NOTES
CARDIOLOGY CONSULTATION      Patient Name: Emma Bo  Primary Care physician: Lizz Barrera DO    Reason for Referral/Chief Complaint: Emma Bo is a 36 y.o. patient who is referred to cardiology clinic today for evaluation and treatment of palpitations. Chief Complaint   Patient presents with    New Patient    Palpitations    Hypertension        History of Present Illness:   Emma Bo is a 36 y.o. male with a medical history significant of diastolic hypertension, hypothyroidism, tobacco abuse, family history of CAD,  and GERD. He was referred by his primary care provider Brandon Blanton DO. He wore a 48 hour holter monitor 23 showed sinus rhythm. Today he is here for palpitations, occur at night while sleeping,. Frequency 2-3 times at week at night. He wore Holter rare PAC's occur while sleeping. He did not have any symptoms while wearing the holter. He reports headache at beginning of August with contributing facial flushing, he was evaluated PCP evaluation, slightly elevated. Used ice pack and Ibuprofen with alleviation. No episodes since. Occasional lightheadedness and dizziness. Family history CAD father  MI (age 61) Mother paternal grandfather  MI. He is former smoker 15 years 1- ppd. Quit 1 year ago. No alcohol abuse. No illicit drug use. No family history of sudden cardiac death. No snoring for most part other than congestion per his wife. The patient denies chest pain, shortness of breath at rest and dyspnea with exertion. Denies near-syncope or christoph syncope. Denies paroxysmal nocturnal dyspnea, orthopnea, bendopnea, increasing lower extremity edema or weight gain.        Past Medical History:   has a past medical history of Allergic rhinitis, Degeneration of lumbar or lumbosacral intervertebral disc, Diastolic hypertension, GERD (gastroesophageal reflux disease), Headache(784.0), Hypothyroidism, Renal calculus, Tobacco abuse, Tobacco abuse,

## 2023-09-25 DIAGNOSIS — R42 DIZZINESS: ICD-10-CM

## 2023-09-25 DIAGNOSIS — Z82.49 FAMILY HISTORY OF EARLY CAD: ICD-10-CM

## 2023-09-25 DIAGNOSIS — I10 DIASTOLIC HYPERTENSION: ICD-10-CM

## 2023-09-25 DIAGNOSIS — R00.2 PALPITATIONS: ICD-10-CM

## 2023-09-27 LAB — LPA SERPL-MCNC: 52 MG/DL

## 2023-09-28 ENCOUNTER — TELEPHONE (OUTPATIENT)
Dept: CARDIOLOGY CLINIC | Age: 43
End: 2023-09-28

## 2023-09-28 ENCOUNTER — HOSPITAL ENCOUNTER (OUTPATIENT)
Dept: CARDIOLOGY | Age: 43
Discharge: HOME | End: 2023-09-28
Payer: COMMERCIAL

## 2023-09-28 DIAGNOSIS — Z87.891 FORMER SMOKER: ICD-10-CM

## 2023-09-28 DIAGNOSIS — R07.89 OTHER CHEST PAIN: ICD-10-CM

## 2023-09-28 DIAGNOSIS — R42 DIZZINESS: ICD-10-CM

## 2023-09-28 DIAGNOSIS — R00.2 PALPITATIONS: Primary | ICD-10-CM

## 2023-09-28 DIAGNOSIS — Z82.49 FAMILY HISTORY OF EARLY CAD: ICD-10-CM

## 2023-09-28 PROCEDURE — 93306 TTE W/DOPPLER COMPLETE: CPT

## 2023-09-28 NOTE — TELEPHONE ENCOUNTER
Spoke with Jonathan Buchanan, relayed lab and echo results. Pt v/u and will contact CS to set up CT Calcium Score test. He was given CS phone number, order placed in chart.

## 2023-09-28 NOTE — TELEPHONE ENCOUNTER
----- Message from Troy Lui MD sent at 9/27/2023 10:30 AM EDT -----  Please notify patient that their lab results show elevated LPa  Rec have the Ca score Dr Melvin Rogers discussed to risk stratify and determine if statin indicated      Please let the patient know the following: Normal echocardiogram showing normal heart strength and no significant valve disease. Follow up as planned.

## 2023-09-29 DIAGNOSIS — R00.2 PALPITATIONS: Primary | ICD-10-CM

## 2023-10-02 ENCOUNTER — TELEPHONE (OUTPATIENT)
Dept: CARDIOLOGY CLINIC | Age: 43
End: 2023-10-02

## 2023-10-02 NOTE — TELEPHONE ENCOUNTER
----- Message from Francisco Javier Calloway MD sent at 9/29/2023  3:49 PM EDT -----  No significant abnormal heart rhythms. Reassuring study.  Follow up as prescribed with AP

## 2023-10-02 NOTE — TELEPHONE ENCOUNTER
Created telephone encounter. Spoke with Tremayne Moreau relayed message per CAMELIA regarding monitor. Pt verbalized understanding. He confirmed 12/12 AMP appt.

## 2023-10-05 RX ORDER — LEVOTHYROXINE SODIUM 0.05 MG/1
50 TABLET ORAL DAILY
Qty: 90 TABLET | Refills: 1 | Status: SHIPPED | OUTPATIENT
Start: 2023-10-05

## 2023-10-05 NOTE — TELEPHONE ENCOUNTER
Refill Request     CONFIRM preferred pharmacy with the patient. If Mail Order Rx - Pend for 90 day refill. Last Seen: Last Seen Department: 8/23/2023  Last Seen by PCP: 8/23/2023    Last Written: 08/16/2023 90 tab 1 refills     If no future appointment scheduled:  Review the last OV with PCP and review information for follow-up visit,  Route STAFF MESSAGE with patient name to the [facility] for scheduling with the following information:            -  Timing of next visit           -  Visit type ie Physical, OV, etc           -  Diagnoses/Reason ie. COPD, HTN - Do not use MEDICATION, Follow-up or CHECK UP - Give reason for visit      Next Appointment:   Future Appointments   Date Time Provider [facility] [address]   10/6/2023  8:00 AM DALIA CT [facility] [place] [facility]   12/12/2023  8:00 AM DO KAREL [place] [facility]   7/8/2024 10:00 AM Sarbjit Blanton DO [facility]       Message sent to [address] [place] [address] to schedule appt with patient?   NO      Requested Prescriptions     Pending Prescriptions Disp Refills    levothyroxine (SYNTHROID) 50 MCG tablet 90 tablet 1     Sig: Take 1 tablet by mouth daily

## 2023-10-06 ENCOUNTER — HOSPITAL ENCOUNTER (OUTPATIENT)
Dept: CT IMAGING | Age: 43
Discharge: HOME | End: 2023-10-06
Attending: STUDENT IN AN ORGANIZED HEALTH CARE EDUCATION/TRAINING PROGRAM
Payer: COMMERCIAL

## 2023-10-06 DIAGNOSIS — R42 DIZZINESS: ICD-10-CM

## 2023-10-06 DIAGNOSIS — R07.89 OTHER CHEST PAIN: ICD-10-CM

## 2023-10-06 DIAGNOSIS — Z87.891 FORMER SMOKER: ICD-10-CM

## 2023-10-06 DIAGNOSIS — R00.2 PALPITATIONS: ICD-10-CM

## 2023-10-06 DIAGNOSIS — Z82.49 FAMILY HISTORY OF EARLY CAD: ICD-10-CM

## 2023-10-06 PROCEDURE — 75571 CT HRT W/O DYE W/CA TEST: CPT

## 2023-10-10 ENCOUNTER — TELEPHONE (OUTPATIENT)
Dept: CARDIOLOGY CLINIC | Age: 43
End: 2023-10-10

## 2023-10-10 NOTE — TELEPHONE ENCOUNTER
----- Message from Fina Lopez MD sent at 10/10/2023  8:21 AM EDT -----  Please notify patient that their Ca score = 0  Very low risk CAD/MI

## 2023-12-07 NOTE — PROGRESS NOTES
CARDIOLOGY  Follow Up      Patient Name: Srini Savage  Primary Care physician: Nii Fajardo DO    Reason for Referral/Chief Complaint: Srini Savage is a 39 y.o. patient who is returning to the cardiology clinic today for three month follow up. Chief Complaint   Patient presents with    Follow-up    Hypertension        History of Present Illness:     Srini Savage is a 39 y.o. male with a medical history significant of diastolic hypertension, hypothyroidism, tobacco abuse, family history of CAD,  and GERD. LOV 9/7/23 he came for palpitations, occuring at night while sleeping. Frequency 2-3 times at week. He wore a Holter that showed rare PAC's occuring while sleeping. He reported headaches at beginning of August with contributing facial flushing. No episodes since. Occasional lightheadedness and dizziness. Today, feels good. States his palpitations have been better. No chest pain or shortness of breath. Former smoke for fifteen years. Quit 3 years ago. Denies alcohol use or recreational drug use. The patient denies chest pain, shortness of breath at rest and dyspnea with exertion. Denies palpitations, dizziness, near-syncope or christoph syncope. Denies paroxysmal nocturnal dyspnea, orthopnea, bendopnea, increasing lower extremity edema or weight gain. 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 Past Medical History:   has a past medical history of Allergic rhinitis, Degeneration of lumbar or lumbosacral intervertebral disc, Diastolic hypertension, GERD (gastroesophageal reflux disease), Headache(784.0), Hypothyroidism, Renal calculus, Tobacco abuse, Tobacco abuse, and Vitamin D deficiency. Surgical History:   has a past surgical history that includes Ureter stent placement (04/07); Tonsillectomy; and [place] tooth extraction. Social History:   reports that he has quit smoking. His smoking use included cigarettes.  He has a 1.50 pack-year smoking Pt states she is feeling sick and puking up \"black\" discharge. She has abdomen pain with it. She had a v-visit for this with  not too long ago. She was supposed to have a GI appointment to check her hernia, but she feels it is more than that. She did mention she has a mesh in her stomach. She wants an in-person visit.

## 2023-12-12 ENCOUNTER — OFFICE VISIT (OUTPATIENT)
Dept: CARDIOLOGY CLINIC | Age: 43
End: 2023-12-12
Payer: COMMERCIAL

## 2023-12-12 VITALS
OXYGEN SATURATION: 98 % | SYSTOLIC BLOOD PRESSURE: 149 MMHG | DIASTOLIC BLOOD PRESSURE: 96 MMHG | HEIGHT: 72 IN | BODY MASS INDEX: 29.93 KG/M2 | HEART RATE: 69 BPM | WEIGHT: 221 LBS

## 2023-12-12 DIAGNOSIS — E78.41 ELEVATED LIPOPROTEIN A LEVEL: ICD-10-CM

## 2023-12-12 DIAGNOSIS — I10 DIASTOLIC HYPERTENSION: Primary | ICD-10-CM

## 2023-12-12 PROCEDURE — G8417 CALC BMI ABV UP PARAM F/U: HCPCS | Performed by: STUDENT IN AN ORGANIZED HEALTH CARE EDUCATION/TRAINING PROGRAM

## 2023-12-12 PROCEDURE — G8427 DOCREV CUR MEDS BY ELIG CLIN: HCPCS | Performed by: STUDENT IN AN ORGANIZED HEALTH CARE EDUCATION/TRAINING PROGRAM

## 2023-12-12 PROCEDURE — G8484 FLU IMMUNIZE NO ADMIN: HCPCS | Performed by: STUDENT IN AN ORGANIZED HEALTH CARE EDUCATION/TRAINING PROGRAM

## 2023-12-12 PROCEDURE — 3080F DIAST BP >= 90 MM HG: CPT | Performed by: STUDENT IN AN ORGANIZED HEALTH CARE EDUCATION/TRAINING PROGRAM

## 2023-12-12 PROCEDURE — 99213 OFFICE O/P EST LOW 20 MIN: CPT | Performed by: STUDENT IN AN ORGANIZED HEALTH CARE EDUCATION/TRAINING PROGRAM

## 2023-12-12 PROCEDURE — 3077F SYST BP >= 140 MM HG: CPT | Performed by: STUDENT IN AN ORGANIZED HEALTH CARE EDUCATION/TRAINING PROGRAM

## 2023-12-12 PROCEDURE — 4004F PT TOBACCO SCREEN RCVD TLK: CPT | Performed by: STUDENT IN AN ORGANIZED HEALTH CARE EDUCATION/TRAINING PROGRAM

## 2023-12-12 RX ORDER — HYDROCODONE BITARTRATE AND ACETAMINOPHEN 5; 325 MG/1; MG/1
1 TABLET ORAL EVERY 6 HOURS PRN
COMMUNITY

## 2023-12-12 RX ORDER — AMLODIPINE BESYLATE 5 MG/1
5 TABLET ORAL DAILY
Qty: 90 TABLET | Refills: 1 | Status: SHIPPED | OUTPATIENT
Start: 2023-12-12

## 2023-12-12 NOTE — PATIENT INSTRUCTIONS
Plan  Discussed [address]. Evolocumab ([address]) works by helping the liver reduce levels of \"bad\" cholesterol (low-density lipoprotein, or LDL) circulating in your blood. We will call the pharmacy to order. Start taking Amlodipine (Norvasc), this is used to treat high blood pressure (hypertension) in adults and children at least 10years old. Lowering blood pressure may lower your risk of a stroke or heart attack. Discussed Lipoprotein A ([employer]es)  Complete fasting lipids and lipoprotein A three months after starting Repatha.

## 2023-12-13 ENCOUNTER — TELEPHONE (OUTPATIENT)
Dept: CARDIOLOGY CLINIC | Age: 43
End: 2023-12-13

## 2023-12-13 NOTE — TELEPHONE ENCOUNTER
----- Message from Luli Ramon DO sent at 12/12/2023  9:36 PM EST -----  No Repatha for Mr. Anisa Mclean after discussing with lipid colleagues. Thanks.  AMP

## 2024-03-22 ENCOUNTER — TELEPHONE (OUTPATIENT)
Dept: CARDIOLOGY CLINIC | Age: 44
End: 2024-03-22

## 2024-03-22 DIAGNOSIS — I10 DIASTOLIC HYPERTENSION: ICD-10-CM

## 2024-03-22 RX ORDER — AMLODIPINE BESYLATE 10 MG/1
10 TABLET ORAL DAILY
Qty: 30 TABLET | Refills: 1 | Status: SHIPPED | OUTPATIENT
Start: 2024-03-22 | End: 2024-05-21

## 2024-03-22 NOTE — TELEPHONE ENCOUNTER
Pts wife called and stated pt has been experiencing /100, pt feels hot, flushed, and red in the face. PT is scheduled for ov 03/26/24 nplr. Pts wife stated she does not know of any other symptoms. Pt ph#[phone]

## 2024-03-22 NOTE — TELEPHONE ENCOUNTER
Called and spoke with pt, he said he has been feeling fine minus his face being flushes. Pt does not keep a bp log, but says the last few times he has checked it, it has been elevated. Bp Wednesday was 150/105. Please advise if any changes need made before he sees NPLR 03/26/24.

## 2024-03-22 NOTE — TELEPHONE ENCOUNTER
Patient returned call. Told him he needs to start taking 10 mg of norvasc and take his BP daily. He v/u

## 2024-03-22 NOTE — TELEPHONE ENCOUNTER
AMP message copied and pasted below      s he taking the norvasc 5mg daily? If he is not, then send refills of this. If he is then increase norvasc to 10mg daily. Recommend checking BP daily and keeping log, goal BP <130/80mmHg.

## 2024-03-25 NOTE — PROGRESS NOTES
[facility]   Cardiology Note              Date:  March 25, 2024  Patientname: Chris Ivy  YOB: 1980    Primary Care physician: Sarbjit Blanton DO    HISTORY OF PRESENT ILLNESS: Chris Ivy is a 43 y.o. male with a history of HTN, hypothyroidism.  Cardiac monitor 9/2023 showed sinus rhythm with rare PACs/PVCs.  CT calcium score 10/6/2023 was 0.  Echo showed EF 55%.    Today he presents for follow-up for HTN.  BP had been elevated at home and amlodipine recently increased to 10 mg daily.  He has a pinching sensation in the center of his chest and then will have a warm feeling in his head and face will be flushed.  The symptoms do not occur with exertion, happen mostly when he is at rest and at night, resolves in 1 minute, happens a few times a week.  He denies any exertional chest pain, shortness of breath, syncope, edema.  He does have occasional palpitations and lightheadedness.  He has an active job in [employer] and tolerates well.     Cardiologist: Dr. Castorena 12/2023    Past Medical History:   has a past medical history of Allergic rhinitis, Degeneration of lumbar or lumbosacral intervertebral disc, Diastolic hypertension, GERD (gastroesophageal reflux disease), Headache(784.0), Hypothyroidism, Renal calculus, Tobacco abuse, Tobacco abuse, and Vitamin D deficiency.    Past Surgical History:   has a past surgical history that includes Ureter stent placement (04/07); Tonsillectomy; and [place] tooth extraction.     Home Medications:    Prior to Admission medications    Medication Sig Start Date End Date Taking? Authorizing Provider   amLODIPine (NORVASC) 10 MG tablet Take 1 tablet by mouth daily 3/22/24 5/21/24  Souleymane Castorena DO   HYDROcodone-acetaminophen (NORCO) 5-325 MG per tablet Take 1 tablet by mouth every 6 hours as needed for Pain. Max Daily Amount: 4 tablets    Provider, MD Fran   levothyroxine (SYNTHROID) 50 MCG tablet Take 1 tablet by mouth daily

## 2024-03-26 ENCOUNTER — OFFICE VISIT (OUTPATIENT)
Dept: CARDIOLOGY CLINIC | Age: 44
End: 2024-03-26
Payer: COMMERCIAL

## 2024-03-26 VITALS
OXYGEN SATURATION: 97 % | WEIGHT: 218.2 LBS | BODY MASS INDEX: 29.55 KG/M2 | SYSTOLIC BLOOD PRESSURE: 142 MMHG | DIASTOLIC BLOOD PRESSURE: 70 MMHG | HEART RATE: 86 BPM | HEIGHT: 72 IN

## 2024-03-26 DIAGNOSIS — I10 PRIMARY HYPERTENSION: Primary | ICD-10-CM

## 2024-03-26 DIAGNOSIS — E78.2 MIXED HYPERLIPIDEMIA: ICD-10-CM

## 2024-03-26 PROCEDURE — G8417 CALC BMI ABV UP PARAM F/U: HCPCS | Performed by: NURSE PRACTITIONER

## 2024-03-26 PROCEDURE — 3077F SYST BP >= 140 MM HG: CPT | Performed by: NURSE PRACTITIONER

## 2024-03-26 PROCEDURE — 3078F DIAST BP <80 MM HG: CPT | Performed by: NURSE PRACTITIONER

## 2024-03-26 PROCEDURE — 99214 OFFICE O/P EST MOD 30 MIN: CPT | Performed by: NURSE PRACTITIONER

## 2024-03-26 PROCEDURE — G8427 DOCREV CUR MEDS BY ELIG CLIN: HCPCS | Performed by: NURSE PRACTITIONER

## 2024-03-26 PROCEDURE — G8484 FLU IMMUNIZE NO ADMIN: HCPCS | Performed by: NURSE PRACTITIONER

## 2024-03-26 PROCEDURE — 93000 ELECTROCARDIOGRAM COMPLETE: CPT | Performed by: NURSE PRACTITIONER

## 2024-03-26 PROCEDURE — 4004F PT TOBACCO SCREEN RCVD TLK: CPT | Performed by: NURSE PRACTITIONER

## 2024-03-26 RX ORDER — HYDROCHLOROTHIAZIDE 25 MG/1
25 TABLET ORAL EVERY MORNING
Qty: 30 TABLET | Refills: 6 | Status: SHIPPED | OUTPATIENT
Start: 2024-03-26

## 2024-03-26 ASSESSMENT — ENCOUNTER SYMPTOMS
GASTROINTESTINAL NEGATIVE: 1
RESPIRATORY NEGATIVE: 1

## 2024-03-26 NOTE — PATIENT INSTRUCTIONS
Start HCTZ 25 mg daily for HTN  Check BP at home and call the office if consistently out of goal range  Blood work in 1-2 weeks  Stay active along with a healthy diet  Follow up

## 2024-03-31 RX ORDER — LEVOTHYROXINE SODIUM 0.05 MG/1
50 TABLET ORAL DAILY
Qty: 90 TABLET | Refills: 0 | Status: SHIPPED | OUTPATIENT
Start: 2024-03-31

## 2024-04-27 DIAGNOSIS — I10 DIASTOLIC HYPERTENSION: ICD-10-CM

## 2024-04-29 RX ORDER — AMLODIPINE BESYLATE 10 MG/1
10 TABLET ORAL DAILY
Qty: 30 TABLET | Refills: 0 | Status: SHIPPED | OUTPATIENT
Start: 2024-04-29

## 2024-06-22 DIAGNOSIS — I10 DIASTOLIC HYPERTENSION: ICD-10-CM

## 2024-06-24 RX ORDER — AMLODIPINE BESYLATE 10 MG/1
10 TABLET ORAL DAILY
Qty: 30 TABLET | Refills: 0 | Status: SHIPPED | OUTPATIENT
Start: 2024-06-24

## 2024-07-05 RX ORDER — LEVOTHYROXINE SODIUM 0.05 MG/1
50 TABLET ORAL DAILY
Qty: 90 TABLET | Refills: 0 | Status: SHIPPED | OUTPATIENT
Start: 2024-07-05

## 2024-07-05 NOTE — TELEPHONE ENCOUNTER
Refill Request     CONFIRM preferred pharmacy with the patient.    If Mail Order Rx - Pend for 90 day refill.      Last Seen: Last Seen Department: 8/23/2023  Last Seen by PCP: 8/23/2023    Last Written: 3/31/24 #90 refills 0    If no future appointment scheduled:  Review the last OV with PCP and review information for follow-up visit,  Route STAFF MESSAGE with patient name to the  Pool for scheduling with the following information:            -  Timing of next visit           -  Visit type ie Physical, OV, etc           -  Diagnoses/Reason ie. COPD, HTN - Do not use MEDICATION, Follow-up or CHECK UP - Give reason for visit      Next Appointment:   Future Appointments   Date Time Provider Department Center   7/8/2024 10:00 AM Sarbjit Blanton DO [facility]   12/17/2024  8:00 AM Souleymane Castorena DO [facility]       Message sent to  to schedule appt with patient?  NO      Requested Prescriptions     Pending Prescriptions Disp Refills    levothyroxine (SYNTHROID) 50 MCG tablet [Pharmacy Med Name: LEVOTHYROXINE 0.05MG (50MCG) TAB] 90 tablet 0     Sig: TAKE 1 TABLET BY MOUTH DAILY

## 2024-07-22 DIAGNOSIS — I10 DIASTOLIC HYPERTENSION: ICD-10-CM

## 2024-07-22 RX ORDER — AMLODIPINE BESYLATE 10 MG/1
10 TABLET ORAL DAILY
Qty: 90 TABLET | Refills: 1 | Status: SHIPPED | OUTPATIENT
Start: 2024-07-22

## 2024-10-03 RX ORDER — LEVOTHYROXINE SODIUM 50 UG/1
50 TABLET ORAL DAILY
Qty: 30 TABLET | Refills: 0 | Status: SHIPPED | OUTPATIENT
Start: 2024-10-03

## 2024-10-03 NOTE — TELEPHONE ENCOUNTER
Refill Request     CONFIRM preferred pharmacy with the patient.    If Mail Order Rx - Pend for 90 day refill.      Last Seen: Last Seen Department: 8/23/2023  Last Seen by PCP: 8/23/2023    Last Written: 7/5/24 90 with no refills     If no future appointment scheduled:  Review the last OV with PCP and review information for follow-up visit,  Route STAFF MESSAGE with patient name to the  Pool for scheduling with the following information:            -  Timing of next visit           -  Visit type ie Physical, OV, etc           -  Diagnoses/Reason ie. COPD, HTN - Do not use MEDICATION, Follow-up or CHECK UP - Give reason for visit      Next Appointment:   Future Appointments   Date Time Provider Department Center   12/17/2024  8:00 AM Souleymane Castorena, DO [facility]       Message sent to  to schedule appt with patient?  YES patient due for annual exam       Requested Prescriptions     Pending Prescriptions Disp Refills    levothyroxine (SYNTHROID) 50 MCG tablet 90 tablet 0     Sig: Take 1 tablet by mouth daily

## 2024-10-31 RX ORDER — LEVOTHYROXINE SODIUM 50 UG/1
50 TABLET ORAL DAILY
Qty: 30 TABLET | Refills: 1 | Status: SHIPPED | OUTPATIENT
Start: 2024-10-31

## 2024-10-31 NOTE — TELEPHONE ENCOUNTER
Refill Request     CONFIRM preferred pharmacy with the patient.    If Mail Order Rx - Pend for 90 day refill.      Last Seen: Last Seen Department: 8/23/2023  Last Seen by PCP: 8/23/2023    Last Written: 10/3/24 30 with no refills     If no future appointment scheduled:  Review the last OV with PCP and review information for follow-up visit,  Route STAFF MESSAGE with patient name to the  Pool for scheduling with the following information:            -  Timing of next visit           -  Visit type ie Physical, OV, etc           -  Diagnoses/Reason ie. COPD, HTN - Do not use MEDICATION, Follow-up or CHECK UP - Give reason for visit      Next Appointment:   Future Appointments   Date Time Provider Department Center   12/17/2024  8:00 AM Souleymane Castorena, DO [facility]       Message sent to  to schedule appt with patient?  YES was due for annual 7/2024       Requested Prescriptions     Pending Prescriptions Disp Refills    levothyroxine (SYNTHROID) 50 MCG tablet [Pharmacy Med Name: LEVOTHYROXINE 0.05MG (50MCG) TAB] 30 tablet 0     Sig: TAKE 1 TABLET BY MOUTH DAILY

## 2024-12-30 ENCOUNTER — PATIENT MESSAGE (OUTPATIENT)
Dept: FAMILY MEDICINE CLINIC | Age: 44
End: 2024-12-30

## 2024-12-31 ENCOUNTER — OFFICE VISIT (OUTPATIENT)
Dept: FAMILY MEDICINE CLINIC | Age: 44
End: 2024-12-31

## 2024-12-31 VITALS
HEART RATE: 72 BPM | BODY MASS INDEX: 33.05 KG/M2 | DIASTOLIC BLOOD PRESSURE: 80 MMHG | SYSTOLIC BLOOD PRESSURE: 120 MMHG | OXYGEN SATURATION: 99 % | HEIGHT: 72 IN | WEIGHT: 244 LBS

## 2024-12-31 DIAGNOSIS — J01.90 ACUTE BACTERIAL SINUSITIS: Primary | ICD-10-CM

## 2024-12-31 DIAGNOSIS — B96.89 ACUTE BACTERIAL SINUSITIS: Primary | ICD-10-CM

## 2024-12-31 PROCEDURE — 3079F DIAST BP 80-89 MM HG: CPT | Performed by: STUDENT IN AN ORGANIZED HEALTH CARE EDUCATION/TRAINING PROGRAM

## 2024-12-31 PROCEDURE — 3074F SYST BP LT 130 MM HG: CPT | Performed by: STUDENT IN AN ORGANIZED HEALTH CARE EDUCATION/TRAINING PROGRAM

## 2024-12-31 PROCEDURE — 99213 OFFICE O/P EST LOW 20 MIN: CPT | Performed by: STUDENT IN AN ORGANIZED HEALTH CARE EDUCATION/TRAINING PROGRAM

## 2024-12-31 RX ORDER — DOXYCYCLINE HYCLATE 100 MG
100 TABLET ORAL 2 TIMES DAILY
Qty: 14 TABLET | Refills: 0 | Status: SHIPPED | OUTPATIENT
Start: 2024-12-31 | End: 2025-01-07

## 2024-12-31 RX ORDER — FLUTICASONE PROPIONATE 50 MCG
2 SPRAY, SUSPENSION (ML) NASAL DAILY
Qty: 16 G | Refills: 1 | Status: SHIPPED | OUTPATIENT
Start: 2024-12-31

## 2024-12-31 RX ORDER — LEVOTHYROXINE SODIUM 50 UG/1
50 TABLET ORAL DAILY
Qty: 30 TABLET | Refills: 1 | Status: SHIPPED | OUTPATIENT
Start: 2024-12-31

## 2024-12-31 RX ORDER — LEVOTHYROXINE SODIUM 50 UG/1
50 TABLET ORAL DAILY
Qty: 90 TABLET | OUTPATIENT
Start: 2024-12-31

## 2024-12-31 SDOH — ECONOMIC STABILITY: INCOME INSECURITY: HOW HARD IS IT FOR YOU TO PAY FOR THE VERY BASICS LIKE FOOD, HOUSING, MEDICAL CARE, AND HEATING?: NOT HARD AT ALL

## 2024-12-31 SDOH — ECONOMIC STABILITY: FOOD INSECURITY: WITHIN THE PAST 12 MONTHS, THE FOOD YOU BOUGHT JUST DIDN'T LAST AND YOU DIDN'T HAVE MONEY TO GET MORE.: NEVER TRUE

## 2024-12-31 SDOH — ECONOMIC STABILITY: FOOD INSECURITY: WITHIN THE PAST 12 MONTHS, YOU WORRIED THAT YOUR FOOD WOULD RUN OUT BEFORE YOU GOT MONEY TO BUY MORE.: NEVER TRUE

## 2024-12-31 ASSESSMENT — PATIENT HEALTH QUESTIONNAIRE - PHQ9
SUM OF ALL RESPONSES TO PHQ QUESTIONS 1-9: 0
SUM OF ALL RESPONSES TO PHQ QUESTIONS 1-9: 0
SUM OF ALL RESPONSES TO PHQ9 QUESTIONS 1 & 2: 0
SUM OF ALL RESPONSES TO PHQ QUESTIONS 1-9: 0
1. LITTLE INTEREST OR PLEASURE IN DOING THINGS: NOT AT ALL
2. FEELING DOWN, DEPRESSED OR HOPELESS: NOT AT ALL
SUM OF ALL RESPONSES TO PHQ QUESTIONS 1-9: 0

## 2024-12-31 NOTE — PROGRESS NOTES
Assessment:  Encounter Diagnosis   Name Primary?    Acute bacterial sinusitis Yes       Plan:  1. Acute bacterial sinusitis  -     fluticasone (FLONASE) 50 MCG/ACT nasal spray; 2 sprays by Each Nostril route daily, Disp-16 g, R-1Normal  -     doxycycline hyclate (VIBRA-TABS) 100 MG tablet; Take 1 tablet by mouth 2 times daily for 7 days, Disp-14 tablet, R-0Normal  Patient with mild symptoms x 7 days however worsening over the last few days.  Significant sinus pressure bilaterally.  Will start Flonase as well as doxycycline given worsening symptoms.      No follow-ups on file.      Patient: Chris Ivy is a 44 y.o. male who presents today with the following Chief Complaint(s):  Chief Complaint   Patient presents with    Congestion     Week         HPI    Increased sinus pressure and pain  Onging > 7 days  Right ear pain/pressure  Significant post nasal drainage  No fever or chills  History of recurrent sinus infections    Current Outpatient Medications   Medication Sig Dispense Refill    levothyroxine (SYNTHROID) 50 MCG tablet TAKE 1 TABLET BY MOUTH DAILY 30 tablet 1    fluticasone (FLONASE) 50 MCG/ACT nasal spray 2 sprays by Each Nostril route daily 16 g 1    doxycycline hyclate (VIBRA-TABS) 100 MG tablet Take 1 tablet by mouth 2 times daily for 7 days 14 tablet 0    HYDROcodone-acetaminophen (NORCO) 5-325 MG per tablet Take 1 tablet by mouth every 6 hours as needed for Pain.      methocarbamol (ROBAXIN) 750 MG tablet       esomeprazole Magnesium (NEXIUM) 40 MG PACK Take 1 packet by mouth daily      amLODIPine (NORVASC) 10 MG tablet TAKE 1 TABLET BY MOUTH DAILY (Patient not taking: Reported on 12/31/2024) 90 tablet 1    hydroCHLOROthiazide (HYDRODIURIL) 25 MG tablet Take 1 tablet by mouth every morning (Patient not taking: Reported on 12/31/2024) 30 tablet 6     No current facility-administered medications for this visit.       Patient's past medical history, surgical history, family history, medications,

## 2024-12-31 NOTE — TELEPHONE ENCOUNTER
Refill Request     CONFIRM preferred pharmacy with the patient.    If Mail Order Rx - Pend for 90 day refill.      Last Seen: Last Seen Department: 8/23/2023  Last Seen by PCP: 8/23/2023    Last Written: 10/31/24 30 with 1 refill     If no future appointment scheduled:  Review the last OV with PCP and review information for follow-up visit,  Route STAFF MESSAGE with patient name to the  Pool for scheduling with the following information:            -  Timing of next visit           -  Visit type ie Physical, OV, etc           -  Diagnoses/Reason ie. COPD, HTN - Do not use MEDICATION, Follow-up or CHECK UP - Give reason for visit      Next Appointment:   No future appointments.    Message sent to  to schedule appt with patient?  YES 1/2025 physical       Requested Prescriptions     Pending Prescriptions Disp Refills    levothyroxine (SYNTHROID) 50 MCG tablet [Pharmacy Med Name: LEVOTHYROXINE 0.05MG (50MCG) TAB] 30 tablet 1     Sig: TAKE 1 TABLET BY MOUTH DAILY

## 2025-01-12 NOTE — PATIENT INSTRUCTIONS
CTP Update: Pt is currently in the ED at [facility], will monitor for admission.    Patient Education        Acute Low Back Pain: Exercises  Your Care Instructions  Here are some examples of typical rehabilitation exercises for your condition. Start each exercise slowly. Ease off the exercise if you start to have pain. Your doctor or physical therapist will tell you when you can start these exercises and which ones will work best for you. When you are not being active, find a comfortable position for rest. Some people are comfortable on the floor or a medium-firm bed with a small pillow under their head and another under their knees. Some people prefer to lie on their side with a pillow between their knees. Don't stay in one position for too long. Take short walks (10 to 20 minutes) every 2 to 3 hours. Avoid slopes, hills, and stairs until you feel better. Walk only distances you can manage without pain, especially leg pain. How to do the exercises  Back stretches    1. Get down on your hands and knees on the floor. 2. Relax your head and allow it to droop. Round your back up toward the ceiling until you feel a nice stretch in your upper, middle, and lower back. Hold this stretch for as long as it feels comfortable, or about 15 to 30 seconds. 3. Return to the starting position with a flat back while you are on your hands and knees. 4. Let your back sway by pressing your stomach toward the floor. Lift your buttocks toward the ceiling. 5. Hold this position for 15 to 30 seconds. 6. Repeat 2 to 4 times. Follow-up care is a key part of your treatment and safety. Be sure to make and go to all appointments, and call your doctor if you are having problems. It's also a good idea to know your test results and keep a list of the medicines you take. Where can you learn more? Go to https://deepika.[employer]. org and sign in to your [employer] account. Enter P692 in the [employer] box to learn more about \"Acute Low Back Pain: Exercises. \"     If you do not have an account, and call your doctor if you are having problems. It's also a good idea to know your test results and keep a list of the medicines you take. Where can you learn more? Go to https://KaChing!peThreadflip.hurleypalmerflatt. org and sign in to your [employer] account. Enter F022 in the [employer] box to learn more about \"Sciatica: Exercises. \"     If you do not have an account, please click on the \"Sign Up Now\" link. Current as of: March 21, 2017  Content Version: 11.3  © 5378-9342 [employer]. Care instructions adapted under license by [facility] ([facility]). If you have questions about a medical condition or this instruction, always ask your healthcare professional. [address] any warranty or liability for your use of this information. No known pertinent  family history related to current admission

## 2025-01-27 NOTE — PROGRESS NOTES
CARDIOLOGY OFFICE NOTE         Patient Name: Chris Ivy  Primary Care physician: Sarbjit Blanton DO    Reason for Referral/Chief Complaint: Chris Ivy is a 44 y.o. patient who presents today for a cardiology follow up.     Chief Complaint   Patient presents with    Follow-up        History of Present Illness:   Chris Ivy is a 44 y.o. male with a medical history significant of diastolic hypertension, hypothyroidism, tobacco abuse, family history of CAD,  and GERD.   OV 9/7/23 he came for palpitations, occuring at night while sleeping. Frequency 2-3 times at week. He wore a Holter that showed rare PAC's occuring while sleeping. He reported headaches at beginning of August with contributing facial flushing. No episodes since. Occasional lightheadedness and dizziness.    Today, he is doing okay. He does monitor his blood pressures, they have been running high so he restarted his amlodipine 10mg on 1/25/25. He stopped taking hydrochlorothiazide in June of 2024. He noticed slight chest pain but he states \" I might have been anxious due to my readings and caused me to have chest pain\". No chest pain since blood pressure has been improving and he feels really well. His blood pressure is now averaging 130/80 mmHg. Patient denies current edema, chest pain, shortness of breath, palpitations, dizziness or syncope. Patient is taking all cardiac medications as prescribed and tolerates them well.     Patient reports he is currently working on obtaining insurance.    Former smoke for fifteen years. Quit 2020. Denies alcohol use or recreational drug use.   Past Medical History:   has a past medical history of Allergic rhinitis, Degeneration of lumbar or lumbosacral intervertebral disc, Diastolic hypertension, GERD (gastroesophageal reflux disease), Headache(784.0), Hypothyroidism, Mixed hyperlipidemia, Renal calculus, Tobacco abuse, Tobacco abuse, and Vitamin D deficiency.    Surgical History:   has a

## 2025-01-30 ENCOUNTER — OFFICE VISIT (OUTPATIENT)
Dept: CARDIOLOGY CLINIC | Age: 45
End: 2025-01-30

## 2025-01-30 VITALS
SYSTOLIC BLOOD PRESSURE: 138 MMHG | OXYGEN SATURATION: 94 % | BODY MASS INDEX: 31.63 KG/M2 | DIASTOLIC BLOOD PRESSURE: 72 MMHG | HEIGHT: 72 IN | HEART RATE: 88 BPM | WEIGHT: 233.5 LBS

## 2025-01-30 DIAGNOSIS — E03.9 HYPOTHYROIDISM, UNSPECIFIED TYPE: ICD-10-CM

## 2025-01-30 DIAGNOSIS — E78.2 MIXED HYPERLIPIDEMIA: ICD-10-CM

## 2025-01-30 DIAGNOSIS — Z82.49 FAMILY HISTORY OF EARLY CAD: Primary | ICD-10-CM

## 2025-01-30 DIAGNOSIS — I10 DIASTOLIC HYPERTENSION: ICD-10-CM

## 2025-01-30 DIAGNOSIS — I10 ESSENTIAL HYPERTENSION: ICD-10-CM

## 2025-01-30 DIAGNOSIS — Z87.891 FORMER SMOKER: ICD-10-CM

## 2025-01-30 PROCEDURE — 3075F SYST BP GE 130 - 139MM HG: CPT | Performed by: STUDENT IN AN ORGANIZED HEALTH CARE EDUCATION/TRAINING PROGRAM

## 2025-01-30 PROCEDURE — 3078F DIAST BP <80 MM HG: CPT | Performed by: STUDENT IN AN ORGANIZED HEALTH CARE EDUCATION/TRAINING PROGRAM

## 2025-01-30 PROCEDURE — 99213 OFFICE O/P EST LOW 20 MIN: CPT | Performed by: STUDENT IN AN ORGANIZED HEALTH CARE EDUCATION/TRAINING PROGRAM

## 2025-01-30 RX ORDER — AMLODIPINE BESYLATE 10 MG/1
10 TABLET ORAL DAILY
Qty: 90 TABLET | Refills: 1 | Status: SHIPPED | OUTPATIENT
Start: 2025-01-30

## 2025-01-30 NOTE — PATIENT INSTRUCTIONS
Continue taking amlodipine 10mg daily.   Recommend having your blood pressure machine checked for accuracy. You can do this by bringing your machine to our office, your primary care office, or your local fire department.   Monitor your blood pressure at home twice a day, morning and night. Recommend a monitor for your bicep. Keep a log for two weeks and call us with the results. Goal 130/80 or less. Also call if BP is low (<100 systolic) or if you are having dizziness/fatigue.

## 2025-02-13 ENCOUNTER — PATIENT MESSAGE (OUTPATIENT)
Dept: CARDIOLOGY CLINIC | Age: 45
End: 2025-02-13

## 2025-02-14 ENCOUNTER — TELEPHONE (OUTPATIENT)
Dept: CARDIOLOGY CLINIC | Age: 45
End: 2025-02-14

## 2025-02-14 RX ORDER — CARVEDILOL 3.12 MG/1
3.12 TABLET ORAL 2 TIMES DAILY
Qty: 180 TABLET | Refills: 3 | Status: SHIPPED | OUTPATIENT
Start: 2025-02-14

## 2025-02-14 RX ORDER — CARVEDILOL 3.12 MG/1
3.12 TABLET ORAL 2 TIMES DAILY
Qty: 180 TABLET | Refills: 1 | Status: SHIPPED | OUTPATIENT
Start: 2025-02-14

## 2025-03-01 DIAGNOSIS — E03.9 HYPOTHYROIDISM, UNSPECIFIED TYPE: Primary | ICD-10-CM

## 2025-03-01 RX ORDER — LEVOTHYROXINE SODIUM 50 UG/1
50 TABLET ORAL DAILY
Qty: 90 TABLET | Refills: 3 | Status: SHIPPED | OUTPATIENT
Start: 2025-03-01

## 2025-03-01 NOTE — TELEPHONE ENCOUNTER
Refill Request     CONFIRM preferred pharmacy with the patient.    If Mail Order Rx - Pend for 90 day refill.      Last Seen: Last Seen Department: 12/31/2024  Last Seen by PCP: 8/23/2023    Last Written: 12/31/2024    If no future appointment scheduled:  Review the last OV with PCP and review information for follow-up visit,  Route STAFF MESSAGE with patient name to the  Pool for scheduling with the following information:            -  Timing of next visit           -  Visit type ie Physical, OV, etc           -  Diagnoses/Reason ie. COPD, HTN - Do not use MEDICATION, Follow-up or CHECK UP - Give reason for visit      Next Appointment:   Future Appointments   Date Time Provider Department Center   4/11/2025  8:45 AM Souleymane Castorena, DO [facility]       Message sent to  to schedule appt with patient?  NO      Requested Prescriptions     Pending Prescriptions Disp Refills    levothyroxine (SYNTHROID) 50 MCG tablet [Pharmacy Med Name: LEVOTHYROXINE 0.05MG (50MCG) TAB] 30 tablet 1     Sig: TAKE 1 TABLET BY MOUTH DAILY   '

## 2025-04-03 NOTE — PROGRESS NOTES
noticed if he eats more salt that his blood pressure is elevated. He reports swelling around his ankles. He does wear compression stockings. He states it is not any worse since starting amlodipine.     Denies snoring, PND, orthopnea.    Patient states he does not believe he snores.  He states his wife is never told if he snores.  He did state that his sons physician is screening his son for sleep apnea.    Patient reports he is currently working on obtaining insurance.    Former smoke for fifteen years. Quit . Denies alcohol use or recreational drug use.   Past Medical History:   has a past medical history of Allergic rhinitis, Degeneration of lumbar or lumbosacral intervertebral disc, Diastolic hypertension, GERD (gastroesophageal reflux disease), Headache(784.0), Hypothyroidism, Mixed hyperlipidemia, Renal calculus, Tobacco abuse, Tobacco abuse, and Vitamin D deficiency.    Surgical History:   has a past surgical history that includes Ureter stent placement (); Tonsillectomy; and [place] tooth extraction.     Social History:   reports that he has quit smoking. His smoking use included cigarettes. He has a 1.5 pack-year smoking history. His smokeless tobacco use includes chew. He reports current alcohol use. He reports that he does not use drugs.   Former smoker 15 years 1- ppd. Quit 1 year ago    Family History:  family history includes COPD in his paternal grandfather; Diabetes in his father and paternal grandfather; Heart Disease in his paternal grandmother.  Father  MI (age 59)  Mother paternal grandfather  MI.     Home Medications:  Were reviewed and are listed in nursing record and/or below  Prior to Admission medications    Medication Sig Start Date End Date Taking? Authorizing Provider   levothyroxine (SYNTHROID) 50 MCG tablet TAKE 1 TABLET BY MOUTH DAILY 3/1/25  Yes Sarbjit Blanton DO   carvedilol (COREG) 3.125 MG tablet Take 1 tablet by mouth 2 times daily 25  Yes Raffaele

## 2025-04-11 ENCOUNTER — OFFICE VISIT (OUTPATIENT)
Dept: CARDIOLOGY CLINIC | Age: 45
End: 2025-04-11

## 2025-04-11 VITALS
DIASTOLIC BLOOD PRESSURE: 80 MMHG | HEIGHT: 72 IN | WEIGHT: 238.5 LBS | SYSTOLIC BLOOD PRESSURE: 130 MMHG | HEART RATE: 80 BPM | OXYGEN SATURATION: 99 % | BODY MASS INDEX: 32.3 KG/M2

## 2025-04-11 DIAGNOSIS — Z82.49 FAMILY HISTORY OF EARLY CAD: ICD-10-CM

## 2025-04-11 DIAGNOSIS — I10 ESSENTIAL HYPERTENSION: Primary | ICD-10-CM

## 2025-04-11 DIAGNOSIS — Z79.899 MEDICATION MANAGEMENT: ICD-10-CM

## 2025-04-11 DIAGNOSIS — Z87.891 FORMER SMOKER: ICD-10-CM

## 2025-04-11 DIAGNOSIS — E78.2 MIXED HYPERLIPIDEMIA: ICD-10-CM

## 2025-04-11 DIAGNOSIS — E03.9 HYPOTHYROIDISM, UNSPECIFIED TYPE: ICD-10-CM

## 2025-04-11 PROCEDURE — G2211 COMPLEX E/M VISIT ADD ON: HCPCS | Performed by: STUDENT IN AN ORGANIZED HEALTH CARE EDUCATION/TRAINING PROGRAM

## 2025-04-11 PROCEDURE — 3079F DIAST BP 80-89 MM HG: CPT | Performed by: STUDENT IN AN ORGANIZED HEALTH CARE EDUCATION/TRAINING PROGRAM

## 2025-04-11 PROCEDURE — 3075F SYST BP GE 130 - 139MM HG: CPT | Performed by: STUDENT IN AN ORGANIZED HEALTH CARE EDUCATION/TRAINING PROGRAM

## 2025-04-11 PROCEDURE — 99214 OFFICE O/P EST MOD 30 MIN: CPT | Performed by: STUDENT IN AN ORGANIZED HEALTH CARE EDUCATION/TRAINING PROGRAM

## 2025-04-11 NOTE — PATIENT INSTRUCTIONS
Continue taking amlodipine 10mg daily and carvedilol 3.125mg twice a day.   Please obtain the following labs; -LIPID FASTING, CBC, CMP, TSH  Limit your salt intake to 2g a day.